# Patient Record
Sex: FEMALE | Race: WHITE | HISPANIC OR LATINO | Employment: FULL TIME | URBAN - METROPOLITAN AREA
[De-identification: names, ages, dates, MRNs, and addresses within clinical notes are randomized per-mention and may not be internally consistent; named-entity substitution may affect disease eponyms.]

---

## 2019-06-24 ENCOUNTER — OFFICE VISIT - HEALTHEAST (OUTPATIENT)
Dept: FAMILY MEDICINE | Facility: CLINIC | Age: 24
End: 2019-06-24

## 2019-06-24 DIAGNOSIS — Z00.00 ROUTINE HEALTH MAINTENANCE: ICD-10-CM

## 2019-06-24 DIAGNOSIS — Z13.1 SCREENING FOR DIABETES MELLITUS: ICD-10-CM

## 2019-06-24 DIAGNOSIS — Z13.220 SCREENING FOR LIPID DISORDERS: ICD-10-CM

## 2019-06-24 DIAGNOSIS — Z12.4 SCREENING FOR MALIGNANT NEOPLASM OF CERVIX: ICD-10-CM

## 2019-06-24 DIAGNOSIS — Z11.3 SCREEN FOR STD (SEXUALLY TRANSMITTED DISEASE): ICD-10-CM

## 2019-06-24 LAB
ANION GAP SERPL CALCULATED.3IONS-SCNC: 11 MMOL/L (ref 5–18)
BUN SERPL-MCNC: 7 MG/DL (ref 8–22)
CALCIUM SERPL-MCNC: 9.4 MG/DL (ref 8.5–10.5)
CHLORIDE BLD-SCNC: 105 MMOL/L (ref 98–107)
CHOLEST SERPL-MCNC: 171 MG/DL
CO2 SERPL-SCNC: 23 MMOL/L (ref 22–31)
CREAT SERPL-MCNC: 0.69 MG/DL (ref 0.6–1.1)
FASTING STATUS PATIENT QL REPORTED: YES
GFR SERPL CREATININE-BSD FRML MDRD: >60 ML/MIN/1.73M2
GLUCOSE BLD-MCNC: 92 MG/DL (ref 70–125)
HDLC SERPL-MCNC: 46 MG/DL
LDLC SERPL CALC-MCNC: 112 MG/DL
POTASSIUM BLD-SCNC: 4.3 MMOL/L (ref 3.5–5)
SODIUM SERPL-SCNC: 139 MMOL/L (ref 136–145)
TRIGL SERPL-MCNC: 63 MG/DL

## 2019-06-24 RX ORDER — CHLORAL HYDRATE 500 MG
2 CAPSULE ORAL DAILY
Status: SHIPPED | COMMUNITY
Start: 2019-06-24 | End: 2022-02-02

## 2019-06-24 ASSESSMENT — MIFFLIN-ST. JEOR: SCORE: 1811.81

## 2019-06-25 ENCOUNTER — AMBULATORY - HEALTHEAST (OUTPATIENT)
Dept: FAMILY MEDICINE | Facility: CLINIC | Age: 24
End: 2019-06-25

## 2019-06-25 DIAGNOSIS — E56.9 VITAMIN DEFICIENCY: ICD-10-CM

## 2019-06-25 DIAGNOSIS — E55.9 VITAMIN D DEFICIENCY: ICD-10-CM

## 2019-06-25 LAB
25(OH)D3 SERPL-MCNC: 16.7 NG/ML (ref 30–80)
25(OH)D3 SERPL-MCNC: 16.7 NG/ML (ref 30–80)
C TRACH DNA SPEC QL PROBE+SIG AMP: NEGATIVE
N GONORRHOEA DNA SPEC QL NAA+PROBE: NEGATIVE

## 2019-06-26 ENCOUNTER — COMMUNICATION - HEALTHEAST (OUTPATIENT)
Dept: FAMILY MEDICINE | Facility: CLINIC | Age: 24
End: 2019-06-26

## 2019-06-26 LAB
BKR LAB AP ABNORMAL BLEEDING: NO
BKR LAB AP BIRTH CONTROL/HORMONES: NORMAL
BKR LAB AP CERVICAL APPEARANCE: NORMAL
BKR LAB AP GYN ADEQUACY: NORMAL
BKR LAB AP GYN INTERPRETATION: NORMAL
BKR LAB AP HPV REFLEX: NORMAL
BKR LAB AP LMP: NORMAL
BKR LAB AP PATIENT STATUS: NORMAL
BKR LAB AP PREVIOUS ABNORMAL: NORMAL
BKR LAB AP PREVIOUS NORMAL: 2015
HIGH RISK?: NO
PATH REPORT.COMMENTS IMP SPEC: NORMAL
RESULT FLAG (HE HISTORICAL CONVERSION): NORMAL

## 2020-02-06 ENCOUNTER — OFFICE VISIT - HEALTHEAST (OUTPATIENT)
Dept: FAMILY MEDICINE | Facility: CLINIC | Age: 25
End: 2020-02-06

## 2020-02-06 DIAGNOSIS — F32.A ANXIETY AND DEPRESSION: ICD-10-CM

## 2020-02-06 DIAGNOSIS — F41.9 ANXIETY AND DEPRESSION: ICD-10-CM

## 2020-02-06 RX ORDER — MEDROXYPROGESTERONE ACETATE 10 MG
TABLET ORAL
Status: SHIPPED | COMMUNITY
Start: 2020-01-28 | End: 2022-02-02

## 2020-02-06 RX ORDER — ASCORBIC ACID 125 MG
TABLET,CHEWABLE ORAL
Status: SHIPPED | COMMUNITY
Start: 2020-01-28 | End: 2022-08-01

## 2020-02-06 ASSESSMENT — ANXIETY QUESTIONNAIRES
3. WORRYING TOO MUCH ABOUT DIFFERENT THINGS: NEARLY EVERY DAY
5. BEING SO RESTLESS THAT IT IS HARD TO SIT STILL: MORE THAN HALF THE DAYS
6. BECOMING EASILY ANNOYED OR IRRITABLE: MORE THAN HALF THE DAYS
2. NOT BEING ABLE TO STOP OR CONTROL WORRYING: NEARLY EVERY DAY
1. FEELING NERVOUS, ANXIOUS, OR ON EDGE: NEARLY EVERY DAY
7. FEELING AFRAID AS IF SOMETHING AWFUL MIGHT HAPPEN: MORE THAN HALF THE DAYS
IF YOU CHECKED OFF ANY PROBLEMS ON THIS QUESTIONNAIRE, HOW DIFFICULT HAVE THESE PROBLEMS MADE IT FOR YOU TO DO YOUR WORK, TAKE CARE OF THINGS AT HOME, OR GET ALONG WITH OTHER PEOPLE: VERY DIFFICULT
4. TROUBLE RELAXING: NEARLY EVERY DAY
GAD7 TOTAL SCORE: 18

## 2020-02-06 ASSESSMENT — PATIENT HEALTH QUESTIONNAIRE - PHQ9: SUM OF ALL RESPONSES TO PHQ QUESTIONS 1-9: 15

## 2020-02-12 ENCOUNTER — RECORDS - HEALTHEAST (OUTPATIENT)
Dept: ADMINISTRATIVE | Facility: OTHER | Age: 25
End: 2020-02-12

## 2020-04-01 ENCOUNTER — COMMUNICATION - HEALTHEAST (OUTPATIENT)
Dept: FAMILY MEDICINE | Facility: CLINIC | Age: 25
End: 2020-04-01

## 2020-04-06 ENCOUNTER — OFFICE VISIT - HEALTHEAST (OUTPATIENT)
Dept: FAMILY MEDICINE | Facility: CLINIC | Age: 25
End: 2020-04-06

## 2020-04-06 DIAGNOSIS — M79.622 PAIN OF LEFT UPPER ARM: ICD-10-CM

## 2020-04-06 DIAGNOSIS — R53.83 FATIGUE, UNSPECIFIED TYPE: ICD-10-CM

## 2020-04-06 DIAGNOSIS — F41.9 ANXIETY: ICD-10-CM

## 2020-04-06 DIAGNOSIS — R14.0 BLOATING: ICD-10-CM

## 2020-04-06 DIAGNOSIS — R19.8 ALTERNATING CONSTIPATION AND DIARRHEA: ICD-10-CM

## 2020-04-07 ENCOUNTER — AMBULATORY - HEALTHEAST (OUTPATIENT)
Dept: LAB | Facility: CLINIC | Age: 25
End: 2020-04-07

## 2020-04-07 DIAGNOSIS — R19.8 ALTERNATING CONSTIPATION AND DIARRHEA: ICD-10-CM

## 2020-04-07 DIAGNOSIS — R53.83 FATIGUE, UNSPECIFIED TYPE: ICD-10-CM

## 2020-04-07 LAB
ALBUMIN SERPL-MCNC: 4.1 G/DL (ref 3.5–5)
ALP SERPL-CCNC: 84 U/L (ref 45–120)
ALT SERPL W P-5'-P-CCNC: 22 U/L (ref 0–45)
ANION GAP SERPL CALCULATED.3IONS-SCNC: 11 MMOL/L (ref 5–18)
AST SERPL W P-5'-P-CCNC: 18 U/L (ref 0–40)
BILIRUB SERPL-MCNC: 0.4 MG/DL (ref 0–1)
BUN SERPL-MCNC: 11 MG/DL (ref 8–22)
CALCIUM SERPL-MCNC: 9.1 MG/DL (ref 8.5–10.5)
CHLORIDE BLD-SCNC: 105 MMOL/L (ref 98–107)
CO2 SERPL-SCNC: 24 MMOL/L (ref 22–31)
CREAT SERPL-MCNC: 0.7 MG/DL (ref 0.6–1.1)
ERYTHROCYTE [DISTWIDTH] IN BLOOD BY AUTOMATED COUNT: 11.3 % (ref 11–14.5)
GFR SERPL CREATININE-BSD FRML MDRD: >60 ML/MIN/1.73M2
GLUCOSE BLD-MCNC: 80 MG/DL (ref 70–125)
HCT VFR BLD AUTO: 41.3 % (ref 35–47)
HGB BLD-MCNC: 14.2 G/DL (ref 12–16)
MCH RBC QN AUTO: 31.8 PG (ref 27–34)
MCHC RBC AUTO-ENTMCNC: 34.5 G/DL (ref 32–36)
MCV RBC AUTO: 92 FL (ref 80–100)
PLATELET # BLD AUTO: 302 THOU/UL (ref 140–440)
PMV BLD AUTO: 7.4 FL (ref 7–10)
POTASSIUM BLD-SCNC: 4.2 MMOL/L (ref 3.5–5)
PROT SERPL-MCNC: 7 G/DL (ref 6–8)
RBC # BLD AUTO: 4.47 MILL/UL (ref 3.8–5.4)
SODIUM SERPL-SCNC: 140 MMOL/L (ref 136–145)
TSH SERPL DL<=0.005 MIU/L-ACNC: 1.28 UIU/ML (ref 0.3–5)
VIT B12 SERPL-MCNC: 641 PG/ML (ref 213–816)
WBC: 7.5 THOU/UL (ref 4–11)

## 2020-04-10 ENCOUNTER — COMMUNICATION - HEALTHEAST (OUTPATIENT)
Dept: FAMILY MEDICINE | Facility: CLINIC | Age: 25
End: 2020-04-10

## 2020-04-10 DIAGNOSIS — R14.0 BLOATING: ICD-10-CM

## 2020-04-10 DIAGNOSIS — R10.2 PELVIC PAIN IN FEMALE: ICD-10-CM

## 2020-04-10 LAB
25(OH)D3 SERPL-MCNC: 25.3 NG/ML (ref 30–80)
25(OH)D3 SERPL-MCNC: 25.3 NG/ML (ref 30–80)

## 2020-04-28 ENCOUNTER — HOSPITAL ENCOUNTER (OUTPATIENT)
Dept: ULTRASOUND IMAGING | Facility: CLINIC | Age: 25
Discharge: HOME | End: 2020-04-28

## 2020-04-28 DIAGNOSIS — R10.2 PELVIC PAIN IN FEMALE: ICD-10-CM

## 2020-04-28 DIAGNOSIS — R14.0 BLOATING: ICD-10-CM

## 2020-04-29 ENCOUNTER — AMBULATORY - HEALTHEAST (OUTPATIENT)
Dept: FAMILY MEDICINE | Facility: CLINIC | Age: 25
End: 2020-04-29

## 2020-04-29 DIAGNOSIS — N83.202 LEFT OVARIAN CYST: ICD-10-CM

## 2020-06-08 ENCOUNTER — COMMUNICATION - HEALTHEAST (OUTPATIENT)
Dept: FAMILY MEDICINE | Facility: CLINIC | Age: 25
End: 2020-06-08

## 2020-06-11 ENCOUNTER — COMMUNICATION - HEALTHEAST (OUTPATIENT)
Dept: SCHEDULING | Facility: CLINIC | Age: 25
End: 2020-06-11

## 2020-06-16 ENCOUNTER — OFFICE VISIT - HEALTHEAST (OUTPATIENT)
Dept: FAMILY MEDICINE | Facility: CLINIC | Age: 25
End: 2020-06-16

## 2020-06-16 DIAGNOSIS — E28.2 PCOS (POLYCYSTIC OVARIAN SYNDROME): ICD-10-CM

## 2020-06-16 DIAGNOSIS — Z30.011 ORAL CONTRACEPTION INITIATION: ICD-10-CM

## 2020-06-29 ENCOUNTER — HOSPITAL ENCOUNTER (OUTPATIENT)
Dept: ULTRASOUND IMAGING | Facility: CLINIC | Age: 25
Discharge: HOME | End: 2020-06-29

## 2020-06-29 DIAGNOSIS — N83.202 LEFT OVARIAN CYST: ICD-10-CM

## 2020-08-26 ENCOUNTER — COMMUNICATION - HEALTHEAST (OUTPATIENT)
Dept: FAMILY MEDICINE | Facility: CLINIC | Age: 25
End: 2020-08-26

## 2020-09-30 ENCOUNTER — AMBULATORY - HEALTHEAST (OUTPATIENT)
Dept: FAMILY MEDICINE | Facility: CLINIC | Age: 25
End: 2020-09-30

## 2020-09-30 ENCOUNTER — COMMUNICATION - HEALTHEAST (OUTPATIENT)
Dept: FAMILY MEDICINE | Facility: CLINIC | Age: 25
End: 2020-09-30

## 2020-09-30 DIAGNOSIS — Z78.9 USES BIRTH CONTROL: ICD-10-CM

## 2021-04-18 ENCOUNTER — MEDICAL CORRESPONDENCE (OUTPATIENT)
Dept: HEALTH INFORMATION MANAGEMENT | Facility: CLINIC | Age: 26
End: 2021-04-18

## 2021-05-27 ASSESSMENT — PATIENT HEALTH QUESTIONNAIRE - PHQ9: SUM OF ALL RESPONSES TO PHQ QUESTIONS 1-9: 15

## 2021-05-28 ASSESSMENT — ANXIETY QUESTIONNAIRES: GAD7 TOTAL SCORE: 18

## 2021-05-29 NOTE — PROGRESS NOTES
FEMALE PREVENTATIVE EXAM    Assessment and Plan:       1. Routine health maintenance  Healthy female exam  - Tdap vaccine,  6yo or older,  IM  - Vitamin D, Total (25-Hydroxy)    2. Screening for diabetes mellitus  - Basic Metabolic Panel    3. Screening for lipid disorders  - Lipid Schleicher FASTING    4. Screening for malignant neoplasm of cervix  - Gynecologic Cytology (PAP Smear)    5. Screen for STD (sexually transmitted disease)  - Chlamydia trachomatis & Neisseria gonorrhoeae, Amplified Detection     Next follow up:  Return in about 1 year (around 6/24/2020) for Physical.    Immunization Review  Adult Imm Review: Due today, orders placed  BMI: 37.72    I discussed the following with the patient:   Adult Healthy Living: Importance of regular exercise  Healthy nutrition  Getting adequate sleep  Stress management  Contraception options    I have had an Advance Directives discussion with the patient.    Subjective:   Chief Complaint: Carline Hart is an 24 y.o. female here for a preventative health visit.     HPI: Patient is here for physical exam.  She works at [employer] full-time and currently lives with her family.  She is in a relationship, sexually active.  On OCPs, as prescribed by [facility].  Patient has a history of PCOS.  She does have some irregularity with her menstrual cycle.  Believes she had a Pap about 3 years ago, which was normal.    Patient was diagnosed with PCOS at the age of 16-17 due to irregular periods.  She did follow with gynecology at that time.  She is currently on progesterone only OCPs, and continues to have some mild spotting with it.    Patient is exercising regularly.  She has lost about 40 pounds in the last year with diet and exercise.  She feels a lot better.    Patient denies any complaints or concerns today.    Healthy Habits  Are you taking a daily aspirin? No  Do you typically exercising at least 40 min, 3-4 times per week?  Yes  Do you usually eat at least 4  "servings of fruit and vegetables a day, include whole grains and fiber and avoid regularly eating high fat foods? Yes  Have you had an eye exam in the past two years? Yes  Do you see a dentist twice per year? NO  Do you have any concerns regarding sleep? No    Safety Screen  If you own firearms, are they secured in a locked gun cabinet or with trigger locks? The patient does not own any firearms  Do you feel you are safe where you are living?: Yes (6/24/2019  1:05 PM)  Do you feel you are safe in your relationship(s)?: Yes (6/24/2019  1:05 PM)      Review of Systems:  Please see above.  The rest of the review of systems are negative for all systems.     Pap History:   No - age 21-29 PAP every 3 years recommended  Cancer Screening       Status Date      PAP SMEAR Overdue 3/27/2015           Patient Care Team:  Lynn Shell NP as PCP - General (Family Medicine)        History     Reviewed By Date/Time Sections Reviewed    Lynn Shell NP 6/24/2019  1:12 PM Tobacco    Lynn Shell NP 6/24/2019  1:10 PM Tobacco    Brittaney Weeks MA 6/24/2019  1:05 PM Tobacco            Objective:   Vital Signs:   Visit Vitals  /62   Pulse 60   Temp 98.5  F (36.9  C)   Ht 5' 6\" (1.676 m)   Wt (!) 233 lb 11.2 oz (106 kg)   BMI 37.72 kg/m           PHYSICAL EXAM  General Appearance: Alert, cooperative, no distress, appears stated age  Head: Normocephalic, without obvious abnormality, atraumatic  Eyes: PERRL, conjunctiva/corneas clear, EOM's intact  Ears: Normal TM's and external ear canals, both ears  Nose: Nares normal, septum midline  Throat: Lips, mucosa, and tongue normal; teeth and gums normal  Neck: Supple, symmetrical, trachea midline, no adenopathy;  thyroid: not enlarged, symmetric, no tenderness/mass/nodules  Back: no CVA tenderness  Lungs: Clear to auscultation bilaterally, respirations unlabored  Breasts: No breast masses, tenderness, asymmetry, or nipple discharge.  Heart: Regular rate and rhythm, S1 and " S2 normal, no murmur, rub, or gallop  Abdomen: Soft, non-tender, bowel sounds active all four quadrants,  no masses, no organomegaly  Pelvic:Normally developed genitalia with no external lesions or eruptions. Vagina and cervix show no lesions, inflammation, discharge or tenderness. No cystocele, No rectocele. No adnexal mass or tenderness.  Extremities: Extremities normal, atraumatic, no cyanosis or edema  Skin: Skin color, texture, turgor normal, no rashes or lesions  Lymph nodes: Cervical, supraclavicular, and axillary nodes normal  Neurologic: Normal   Psychologic: appropriate affective, answers all of my questions appropriately. No hallucinations, delusion, or suicidal ideations.    Lynn Shell, NP

## 2021-06-03 VITALS — WEIGHT: 233.7 LBS | HEIGHT: 66 IN | BODY MASS INDEX: 37.56 KG/M2

## 2021-06-04 VITALS
BODY MASS INDEX: 38.32 KG/M2 | HEART RATE: 100 BPM | DIASTOLIC BLOOD PRESSURE: 72 MMHG | WEIGHT: 230.3 LBS | SYSTOLIC BLOOD PRESSURE: 110 MMHG

## 2021-06-04 VITALS — WEIGHT: 225 LBS | BODY MASS INDEX: 37.44 KG/M2

## 2021-06-05 NOTE — PROGRESS NOTES
"Assessment/Plan:     1. Anxiety and depression  Symptoms do appear consistent with anxiety and depression.  No clear trigger, but she does have some upcoming changes including moving.  No suicidal ideations.  Discussed treatment of depression and anxiety including medication and/or therapy.  Patient does not wish to start any medication at this time.  She is interested in seeing a therapist.  Encouraged good sleep hygiene, regular exercise, deep breathing, and guided imagery.  Patient may continue the Vistaril as needed for anxiety.  I suspect her chest pain is secondary to anxiety.  She will certainly follow-up with any new or worsening symptoms.  - AMB REFERRAL TO MENTAL HEALTH AND ADDICTION  - Adult (18+); Outpatient Treatment; Individual/Couples/Family/Group Therapy/Health Psychology; Co-located Psychotherapy; [facility]; Please call [phone] or speak with a specialty  wh...        Subjective:     Carline Hart is a 24 y.o. female who presents for ED follow-up.  Patient was seen in the ED 2 days ago with symptoms of chest/breast pain.  She did have a work-up including labs, EKG and chest x-ray.  There were no acute findings.  Symptoms have been going on for about a month.  Reports left-sided chest pain that will sometimes radiate to the armpit or the neck.  She does not feel significantly short of breath.  She has been feeling anxious as well as a little bit more depressed.  Reports she is sleeping \"too much\".  Also feeling very low motivation or concentration to do things.  Her appetite has been very poor.  Patient cannot target any specific triggers for her symptoms.  She does feel very teary at times.  She did recently just get a new car, but this was a good change.  She will also be moving to a new apartment with her boyfriend, which is a good change as well.  Work is going well, and she does not feel a lot of anxiety there.  No history of anxiety or depression.  Patient was prescribed " Vistaril in the ED.  She does find this helpful for her anxiety, but makes her feel tired.  Patient denies any suicidal ideations.      The following portions of the patient's history were reviewed and updated as appropriate: allergies, current medications.    Review of Systems  A comprehensive review of systems was performed and was otherwise negative    Objective:     /72   Pulse 100   Wt (!) 230 lb 4.8 oz (104.5 kg)   BMI 38.32 kg/m      General Appearance: Alert, cooperative, no distress, appears stated age  Lungs: Clear to auscultation bilaterally, respirations unlabored  Heart: Regular rate and rhythm, S1 and S2 normal, no murmur, rub, or gallop  Psychologic: appropriate affective, answers all of my questions appropriately. No hallucinations, delusion, or suicidal ideations.    PHQ-9 Total Score: 15 (2/6/2020  1:00 PM)    BHARAT 7 Total Score: 18 (2/6/2020  1:00 PM)        Lynn Shell NP

## 2021-06-07 NOTE — PROGRESS NOTES
Patient: Lucy Lee Date of Service: 2020   : 1985 MRN: [id number]     This visit is being performed via video to discuss No chief complaint on file.    [facility] and her identity has been established.   She was informed that consent to treat includes permission to submit charges to the applicable insurance on file. Lucy was advised regarding the potential risk inherent in video visits, as the assessment may be limited due to what can be seen on the screen which potentially results in an incomplete assessment; as well as either of us may discontinue the video visit if it is felt that the videoconferencing connections are not adequate for his/her situation.   11-20 minutes were spent in this encounter.      SUBJECTIVE:     HISTORY OF PRESENT ILLNESS:  Lucy Lee is a 35 year old female who presents today for a check-up for spotting between menses and anxiety.    Ended up stopping Ashlyna birth control a few weeks ago. Still having night sweats.    She lowered her lexapro to 5mg and is taking buspar twice a day. Her anxiety has been much better. Thinks her sweating might be related to her meds. Also had some twitching when her lexapro was at 20mg, but has lessened since decreasing, has not entirely resolved.    PAST MEDICAL HISTORY:  Past Medical History:   Diagnosis Date   • Anxiety    • Gastroesophageal reflux disease      Patient Active Problem List   Diagnosis   • Rapid palpitations   • Migraine without aura   • Acquired hypothyroidism   • Hashimoto's thyroiditis   • Eustachian tube disorder   • MONICA (generalized anxiety disorder)   • Tiredness   • Goiter   • Pressure in head   • Ketosis (CMS/HCC)   • Thyroid nodule   • Iron deficiency   • Left flank pain   • Upper abdominal pain   • Bloating       MEDICATIONS:  Current Outpatient Medications   Medication Sig   • omeprazole (PriLOSEC) 40 MG capsule Take 1  "Carline Hart is a 25 y.o. female who is being evaluated via a billable telephone visit.      The patient has been notified of following:     \"This telephone visit will be conducted via a call between you and your physician/provider. We have found that certain health care needs can be provided without the need for a physical exam.  This service lets us provide the care you need with a short phone conversation.  If a prescription is necessary we can send it directly to your pharmacy.  If lab work is needed we can place an order for that and you can then stop by our lab to have the test done at a later time.    If during the course of the call the physician/provider feels a telephone visit is not appropriate, you will not be charged for this service.\"     Patient has given verbal consent to a Telephone visit? Yes    Carline Hart complains of    Chief Complaint   Patient presents with     Follow-up     chest and upper arm pain. is getting worse, more consitant, headache is new, neck pain, lower abdominal pain (pelvis), constipation. constantly tired     okay to call sooner       I have reviewed and updated the patient's Past Medical History, Medication List.    ALLERGIES  Patient has no known allergies.    Patient presents today via telephone for follow-up.  She was last seen about 2 months ago with symptoms of anxiety.  This was after she had been seen in the hospital for some chest and arm pain.  Patient opted to start some counseling at that time.  Her anxiety has been much better.  She has been working really hard on sleep hygiene and regular exercise.  She has also been eating much better.  Overall, her mood and mental health feel much better.  However, patient is having symptoms of fatigue, alternating constipation/diarrhea, and left arm/axilla pain.  Patient goes between feeling constipated and having looser stools.  She denies any significant abdominal pain, nausea, vomiting, or blood in her stools.  She " "does have some intermittent pelvic pain.  No abnormal vaginal discharge or bleeding.  No urinary symptoms.  She will get \"sharp/electric\" pains down her back and in her arm/armpit area.  This comes and goes.  Denies any weakness in the hands no numbness/tingling.  She denies any significant neck pain.  She has had some mild headaches.  Drinking plenty of water.  Patient has tried Tylenol, ibuprofen, heat, and massage for the left arm pain.  This has not been terribly beneficial.  She is not having any heartburn, but does feel more bloated.  She has lost a little bit of weight, but she has been trying to do this.  She feels like she is more tired than she should be.    Assessment/Plan:  1. Anxiety    2. Alternating constipation and diarrhea  - HM2(CBC w/o Differential); Future  - Comprehensive Metabolic Panel; Future  - omeprazole (PRILOSEC) 20 MG capsule; Take 1 capsule (20 mg total) by mouth daily before breakfast for 14 days.  Dispense: 14 capsule; Refill: 0    3. Pain of left upper arm    4. Bloating  - omeprazole (PRILOSEC) 20 MG capsule; Take 1 capsule (20 mg total) by mouth daily before breakfast for 14 days.  Dispense: 14 capsule; Refill: 0    5. Fatigue, unspecified type  - Vitamin D, Total (25-Hydroxy); Future  - Thyroid Cascade; Future  - Vitamin B12; Future    Patient with a variety of new symptoms.  She is done a great job of managing her anxiety with diet, exercise, sleep hygiene, and counseling.  At this point I do not think her symptoms are related to anxiety.  Possible alternating constipation/diarrhea secondary to diet changes.  Questioning whether she has some silent acid reflux with her arm/chest pain, bloating, and GI issues.  We will trial her on a PPI, omeprazole 20 mg once daily x2 weeks.  She does not seem to have any specific food triggers.  Will repeat some labs to set for her fatigue and GI issues.  Hard to assess arm pain, as it is intermittent.  Does not seem to be associated with any " capsule by mouth daily.   • ALPRAZolam (XANAX) 0.5 MG tablet Take 1 tablet by mouth 2 times daily as needed for Sleep or Anxiety.   • busPIRone (BUSPAR) 5 MG tablet Take 5 mg by mouth 2 times daily.   • escitalopram (LEXAPRO) 10 MG tablet Take 1 tablet by mouth daily.   • levonorgestrel-eth estradiol & eth estradiol (Ashlyna) 0.15-0.03 &0.01 MG tablet Take 1 tablet by mouth daily.   • baclofen (LIORESAL) 10 MG tablet Take 1 tablet by mouth at bedtime as needed (spasm).   • levothyroxine 75 MCG tablet Take 1 tablet by mouth daily.   • CRANBERRY PO    • Probiotic Product (PROBIOTIC DAILY PO)    • Cholecalciferol (VITAMIN D3) 3000 units Tab Take 6,000 Units by mouth.   • MAGNESIUM PO    • Multiple Vitamins-Minerals (WOMENS MULTIVITAMIN PO)      No current facility-administered medications for this visit.        ALLERGIES:  ALLERGIES:   Allergen Reactions   • Penicillins RASH     This includes all medications in the \"cillin\" family     • Amoxicillin Er Other (See Comments)   • Hydrocodone-Acetaminophen Other (See Comments)   • Hydrocodone-Apap Other (See Comments)       PAST SURGICAL HISTORY:  Past Surgical History:   Procedure Laterality Date   • Ankle surgery         FAMILY HISTORY:  No family history on file.    SOCIAL HISTORY:  Social History     Tobacco Use   • Smoking status: Never Smoker   • Smokeless tobacco: Never Used   Substance Use Topics   • Alcohol use: Yes     Frequency: Monthly or less     Drinks per session: 1 or 2     Comment: socially   • Drug use: No       Review of Systems   Constitutional: Negative.  Negative for appetite change, fatigue and fever.   HENT: Negative.    Eyes: Negative.    Respiratory: Negative.  Negative for shortness of breath.    Cardiovascular: Negative.  Negative for chest pain.   Gastrointestinal: Negative.    Endocrine: Negative.    Genitourinary: Positive for menstrual problem and vaginal bleeding (between anticipated menses).        Breast tenderness   Musculoskeletal:  Negative.    Skin: Negative.    Allergic/Immunologic: Negative.    Neurological: Negative.    Hematological: Negative.    Psychiatric/Behavioral: Positive for dysphoric mood. The patient is nervous/anxious.          OBJECTIVE:     Visit Vitals  LMP 09/23/2020 (Exact Date)       Wt Readings from Last 1 Encounters:   11/09/20 77.2 kg (170 lb 1.6 oz)        Physical Exam   Constitutional: She is oriented to person, place, and time. Vital signs are normal. She is cooperative. She does not appear ill. No distress.   HENT:   Head: Normocephalic and atraumatic.   Right Ear: Hearing normal.   Left Ear: Hearing normal.   Nose: Nose normal.   Eyes: Conjunctivae and lids are normal.   Neck: Phonation normal.   Pulmonary/Chest: Effort normal.   Abdominal: There is no abdominal tenderness.   Musculoskeletal:      Comments: ROM visualized on video normal.   Neurological: She is alert and oriented to person, place, and time.   Skin:   Skin visualized on video intact.   Psychiatric: She has a normal mood and affect. Her speech is normal and behavior is normal. Judgment and thought content normal. Cognition and memory are normal.   Nursing note and vitals reviewed.      ASSESSMENT AND PLAN:     Diagnoses and all orders for this visit:  MONICA (generalized anxiety disorder)  Stop lexapro all together. Continue buspar. This might resolve night sweats and twitching.  Discussed stress management, healthy diet, sleep and activity. Recommended counseling.  Hormones will be leveling off from stopping birth control.  If side effects persist we can continue to adjust medications, though she does state she feels much better.     No follow-ups on file.    The patient indicated understanding and agreed with the plan of care.      Tamanna Thakur, VIKA  12/9/2020   cervical spinal issues.  Consider physical therapy versus neurology referral down the road.    Phone call duration:  15 minutes  Start: 545  End: 402    Lynn Shell NP

## 2021-06-08 NOTE — PROGRESS NOTES
"Carline Hart is a 25 y.o. female who is being evaluated via a billable video visit.      The patient has been notified of following:     \"This video visit will be conducted via a call between you and your physician/provider. We have found that certain health care needs can be provided without the need for an in-person physical exam.  This service lets us provide the care you need with a video conversation.  If a prescription is necessary we can send it directly to your pharmacy.  If lab work is needed we can place an order for that and you can then stop by our lab to have the test done at a later time.    Video visits are billed at different rates depending on your insurance coverage. Please reach out to your insurance provider with any questions.    If during the course of the call the physician/provider feels a video visit is not appropriate, you will not be charged for this service.\"    Patient has given verbal consent to a Video visit? Yes    Will anyone else be joining your video visit? No        Video Start Time: 1137     Patient presents via video visit to discuss contraception.  She is not currently using anything.  Patient is sexually active.  She was previously following with gynecology, as she was not getting regular menstrual periods.  She does have a diagnosis of PCOS.  Patient was previously using Provera to initiate her periods, but has had normal periods the last 2 months without it.  Bleeding is usually very mild and lasts about 3 days.  Patient has been on both combined OCPs and the minipill in the past.  She recalls her anxiety and depression got much worse while on the progesterone-only pill.  She has also tried to the Nexplanon, but likes to get monthly bleeding.  Patient does not smoke.  No personal history of blood clots.  Her sister did have a blood clot after her pregnancy.    Additional provider notes: GENERAL: Healthy, alert and no distress  EYES: Eyes grossly normal to inspection. No " discharge or erythema, or obvious scleral/conjunctival abnormalities.  RESP: No audible wheeze, cough, or visible cyanosis.  No visible retractions or increased work of breathing.    NEURO: Cranial nerves grossly intact. Mentation and speech appropriate for age.  PSYCH: Mentation appears normal, affect normal/bright, judgement and insight intact, normal speech and appearance well-groomed    Assessment/Plan:  1. Oral contraception initiation  No contraindications to use.  Patient will start this Sunday.  Instructed on proper use and possible side effects of medication.  Recommend backup contraception or abstinence until she starts the pill and the first week she is back on.  May take 3 months to fully regulate cycle.  She will notify me if she develops any side effects.   - norethindrone-ethinyl estradiol (MICROGESTIN 1/20) 1-20 mg-mcg per tablet; Take 1 tablet by mouth daily.  Dispense: 3 Package; Refill: 4    2. PCOS (polycystic ovarian syndrome)  - norethindrone-ethinyl estradiol (MICROGESTIN 1/20) 1-20 mg-mcg per tablet; Take 1 tablet by mouth daily.  Dispense: 3 Package; Refill: 4    Video-Visit Details    Type of service:  Video Visit    Video End Time (time video stopped): 1150  Originating Location (pt. Location): Home    Distant Location (provider location):  [facility]     Platform used for Video Visit: Sloane Shell NP

## 2021-06-16 PROBLEM — E28.2 PCOS (POLYCYSTIC OVARIAN SYNDROME): Status: ACTIVE | Noted: 2017-11-21

## 2021-06-16 PROBLEM — E55.9 VITAMIN D DEFICIENCY: Status: ACTIVE | Noted: 2019-06-25

## 2021-06-17 NOTE — PATIENT INSTRUCTIONS - HE
Patient Instructions by Lynn Shell NP at 6/24/2019  1:00 PM     Author: Lynn Shell NP Service: -- Author Type: Nurse Practitioner    Filed: 6/24/2019  1:08 PM Encounter Date: 6/24/2019 Status: Signed    : Lynn Shell NP (Nurse Practitioner)       Patient Education     Prevention Guidelines, Women Ages 18 to 39  Screening tests and vaccines are an important part of managing your health. A screening test is done to find possible disorders or diseases in people who don't have any symptoms. The goal is to find a disease early so lifestyle changes can be made and you can be watched more closely to reduce the risk of disease, or to detect it early enough to treat it most effectively. Screening tests are not considered diagnostic, but are used to determine if more testing is needed. Health counseling is essential, too. Below are guidelines for these, for women ages 18 to 39. Talk with your healthcare provider to make sure youre up-to-date on what you need.  Screening Who needs it How often   Alcohol misuse All women in this age group At routine exams   Blood pressure All women in this age group Yearly checkup if your blood pressure is normal  Normal blood pressure is less than 120/80 mm Hg  If your blood pressure reading is higher than normal, follow the advice of your healthcare provider   Breast cancer All women in this age group should talk with their healthcare providers about the need for clinical breast exams (CBE)1 Clinical breast exam every 3 years1   Cervical cancer Women ages 21 and older Women between ages 21 and 29 should have a Pap test every 3 years; women between ages 30 and 65 are advised to have a Pap test plus an HPV test every 5 years   Chlamydia Sexually active women ages 24 and younger, and women at increased risk for infection Every 3 years if you're at risk or have symptoms   Depression All women in this age group At routine exams   Type 2 diabates, prediabetes All women  with no symptoms who are overweight or obese and have 1 or more other risk factors for diabetes At least every 3 years. Also, testing for diabetes during pregnancy after the 24th week.    Type 2 diabetes, prediabetes All women diagnosed with gestational diabetes Lifelong testing every 3 years   Type 2 diabetes All women with prediabetes Every year   Gonorrhea Sexually active women at increased risk for infection At routine exams   Hepatitis C Anyone at increased risk At routine exams   HIV All women should be tested at least once for HIV between the ages of 13 and 64 At routine exams. Those with risk factors for HIV should be tested at least annually.    Obesity All women in this age group At routine exams   Syphilis Women at increased risk for infection should talk with their healthcare provider At routine exams   Tuberculosis Women at increased risk for infection should talk with their healthcare provider Ask your healthcare provider   Vision All women in this age group At least 1 complete exam in your 20s, and 2 in your 30s   Vaccine2 Who needs it How often   Chickenpox (varicella) All women in this age group who have no record of this infection or vaccine 2 doses; the second dose should be given 4 to 8 weeks after the first dose   Hepatitis A Women at increased risk for infection should talk with their healthcare provider 2 doses given at least 6 months apart   Hepatitis B Women at increased risk for infection should talk with their healthcare provider 3 doses over 6 months; second dose should be given 1 month after the first dose; the third dose should be given at least 2 months after the second dose and at least 4 months after the first dose   Haemophilus influenzae Type B (HIB) Women at increased risk for infection should talk with their healthcare provider 1 to 3 doses   Human papillomavirus (HPV) All women in this age group up to age 26 3 doses; the second dose should be given 1 to 2 months after the first  dose and the third dose given 6 months after the first dose   Influenza (flu) All women in this age group Once a year   Measles, mumps, rubella (MMR) All women in this age group who have no record of these infections or vaccines 1 or 2 doses   Meningococcal Women at increased risk for infection should talk with their healthcare provider 1 or more doses   Pneumococcal conjugate vaccine (PCV13) and pneumococcal polysaccharide vaccine (PPSV23) Women at increased risk for infection should talk with their healthcare provider PCV13: 1 dose ages 19 to 65 (protects against 13 types of pneumococcal bacteria)  PPSV23: 1 to 2 doses through age 64, or 1 dose at 65 or older (protects against 23 types of pneumococcal bacteria)      Tetanus/diphtheria/pertussis (Td/Tdap) booster All women in this age group Td every 10 years, or a one-time dose of Tdap instead of a Td booster after age 18, then Td every 10 years   Counseling Who needs it How often   BRCA gene mutation testing for breast and ovarian cancer susceptibility Women with increased risk for having gene mutation When your risk is known   Breast cancer and chemoprevention Women at high risk for breast cancer When your risk is known   Diet and exercise Women who are overweight or obese When diagnosed, and then at routine exams   Domestic violence Women at the age in which they are able to have children At routine exams   Sexually transmitted infection prevention Women who are sexually active At routine exams   Skin cancer Prevention of skin cancer in fair-skinned adults At routine exams   Use of tobacco and the health effects it can cause All women in this age group Every visit   1According to the ACS, women ages 20 to 39 years should have a clinical breast exam (CBE) as part of their routine health exam every 3 years. Breast self-exams are an option for women starting in their 20s. But the USPSTF does not recommend CBE.  2Those who are 18 years old and not up-to-date on  their childhood vaccines should get all appropriate catch-up vaccines recommended by the CDC.  Date Last Reviewed: 10/1/2017    1867-9136 [employer]. [address] [place] [address], [place] [address]. All rights reserved. This information is not intended as a substitute for professional medical care. Always follow your healthcare professional's instructions.

## 2021-06-26 ENCOUNTER — HEALTH MAINTENANCE LETTER (OUTPATIENT)
Age: 26
End: 2021-06-26

## 2021-10-16 ENCOUNTER — HEALTH MAINTENANCE LETTER (OUTPATIENT)
Age: 26
End: 2021-10-16

## 2021-12-30 ENCOUNTER — MEDICAL CORRESPONDENCE (OUTPATIENT)
Dept: HEALTH INFORMATION MANAGEMENT | Facility: CLINIC | Age: 26
End: 2021-12-30

## 2022-02-02 ENCOUNTER — OFFICE VISIT (OUTPATIENT)
Dept: OBGYN | Facility: CLINIC | Age: 27
End: 2022-02-02
Payer: COMMERCIAL

## 2022-02-02 VITALS
HEIGHT: 65 IN | DIASTOLIC BLOOD PRESSURE: 76 MMHG | TEMPERATURE: 97.7 F | RESPIRATION RATE: 16 BRPM | WEIGHT: 245 LBS | HEART RATE: 81 BPM | BODY MASS INDEX: 40.82 KG/M2 | SYSTOLIC BLOOD PRESSURE: 123 MMHG

## 2022-02-02 DIAGNOSIS — Z30.011 BCP (BIRTH CONTROL PILLS) INITIATION: ICD-10-CM

## 2022-02-02 DIAGNOSIS — Z12.4 CERVICAL CANCER SCREENING: Primary | ICD-10-CM

## 2022-02-02 DIAGNOSIS — R79.89 LOW VITAMIN D LEVEL: ICD-10-CM

## 2022-02-02 PROBLEM — E66.01 MORBID OBESITY (H): Status: ACTIVE | Noted: 2022-02-02

## 2022-02-02 PROCEDURE — 82306 VITAMIN D 25 HYDROXY: CPT | Performed by: ADVANCED PRACTICE MIDWIFE

## 2022-02-02 PROCEDURE — 36415 COLL VENOUS BLD VENIPUNCTURE: CPT | Performed by: ADVANCED PRACTICE MIDWIFE

## 2022-02-02 PROCEDURE — G0145 SCR C/V CYTO,THINLAYER,RESCR: HCPCS | Performed by: ADVANCED PRACTICE MIDWIFE

## 2022-02-02 RX ORDER — ACETAMINOPHEN AND CODEINE PHOSPHATE 120; 12 MG/5ML; MG/5ML
0.35 SOLUTION ORAL DAILY
Qty: 84 TABLET | Refills: 3 | Status: SHIPPED | OUTPATIENT
Start: 2022-02-02 | End: 2022-08-01

## 2022-02-02 ASSESSMENT — ANXIETY QUESTIONNAIRES
6. BECOMING EASILY ANNOYED OR IRRITABLE: SEVERAL DAYS
7. FEELING AFRAID AS IF SOMETHING AWFUL MIGHT HAPPEN: NOT AT ALL
GAD7 TOTAL SCORE: 1
5. BEING SO RESTLESS THAT IT IS HARD TO SIT STILL: NOT AT ALL
3. WORRYING TOO MUCH ABOUT DIFFERENT THINGS: NOT AT ALL
1. FEELING NERVOUS, ANXIOUS, OR ON EDGE: NOT AT ALL
2. NOT BEING ABLE TO STOP OR CONTROL WORRYING: NOT AT ALL
IF YOU CHECKED OFF ANY PROBLEMS ON THIS QUESTIONNAIRE, HOW DIFFICULT HAVE THESE PROBLEMS MADE IT FOR YOU TO DO YOUR WORK, TAKE CARE OF THINGS AT HOME, OR GET ALONG WITH OTHER PEOPLE: NOT DIFFICULT AT ALL

## 2022-02-02 ASSESSMENT — PATIENT HEALTH QUESTIONNAIRE - PHQ9
SUM OF ALL RESPONSES TO PHQ QUESTIONS 1-9: 2
5. POOR APPETITE OR OVEREATING: NOT AT ALL

## 2022-02-02 ASSESSMENT — MIFFLIN-ST. JEOR: SCORE: 1847.19

## 2022-02-02 NOTE — PROGRESS NOTES
"Initial There were no vitals taken for this visit. Estimated body mass index is 37.44 kg/m  as calculated from the following:    Height as of 20: 1.651 m (5' 5\").    Weight as of 20: 102.1 kg (225 lb). .       SUBJECTIVE:  is here for a 6-week postpartum checkup.    Delivery date was 21. She had a  of a viable , with Preeclampsia complications.  Since delivery, she has been breast feeding.  She has no signs of infection, bleeding or other complications.    We discussed contraception and she has chosen mini pill / progesterone only pill.  She  has not had intercourse since delivery and complains of No discomfort. Patient screened for postpartum depression and complaints are NEGATIVE. Screening has also been completed for intimate partner violence.    EXAM:  GENERAL healthy, alert, no distress, cooperative, smiling and over weight  RESP: Clear to auscultation  BREASTS: NEGATIVE  CV: NEGATIVE  GYN PELVIC: NEGATIVE  Well healed   Speculum placed and pap collected   SKIN: no ulcers, lesions or rash  PSYCH: NEGATIVE      ASSESSMENT:   Normal postpartum exam after .  Birth control pill initiation   Vit D     PLAN:  Return as needed or at time interval for next routine pap, pelvic, or breast exam.  Encourage Kegals and abdominal exercise.  Continue a multi vitamin supplement, especially if breastfeeding.  Pap smear was obtained.  Post partum Hgb was not obtained.  Vit D collected per her request due to history of severe vit D deficiency   Reviewed how to take minipill.  Use back up for 1 week.  Take pills at same time every day.      Jeannette Rooney, STEPHON, APRN, CNM          "

## 2022-02-03 LAB — DEPRECATED CALCIDIOL+CALCIFEROL SERPL-MC: 27 UG/L (ref 20–75)

## 2022-02-03 ASSESSMENT — ANXIETY QUESTIONNAIRES: GAD7 TOTAL SCORE: 1

## 2022-02-07 LAB
BKR LAB AP GYN ADEQUACY: NORMAL
BKR LAB AP GYN INTERPRETATION: NORMAL
BKR LAB AP HPV REFLEX: NORMAL
BKR LAB AP PREVIOUS ABNORMAL: NORMAL
PATH REPORT.COMMENTS IMP SPEC: NORMAL
PATH REPORT.COMMENTS IMP SPEC: NORMAL
PATH REPORT.RELEVANT HX SPEC: NORMAL

## 2022-04-26 ENCOUNTER — OFFICE VISIT (OUTPATIENT)
Dept: URGENT CARE | Facility: URGENT CARE | Age: 27
End: 2022-04-26
Payer: COMMERCIAL

## 2022-04-26 VITALS
TEMPERATURE: 98.7 F | SYSTOLIC BLOOD PRESSURE: 118 MMHG | OXYGEN SATURATION: 97 % | DIASTOLIC BLOOD PRESSURE: 88 MMHG | HEART RATE: 101 BPM

## 2022-04-26 DIAGNOSIS — R07.0 THROAT PAIN: Primary | ICD-10-CM

## 2022-04-26 LAB
DEPRECATED S PYO AG THROAT QL EIA: NEGATIVE
GROUP A STREP BY PCR: NOT DETECTED

## 2022-04-26 PROCEDURE — 99203 OFFICE O/P NEW LOW 30 MIN: CPT | Performed by: PHYSICIAN ASSISTANT

## 2022-04-26 PROCEDURE — 87651 STREP A DNA AMP PROBE: CPT | Performed by: PHYSICIAN ASSISTANT

## 2022-04-26 NOTE — PROGRESS NOTES
"  Assessment & Plan     Throat pain  Rapid strep negative. This is likely viral. Continue with supportive care. Get plenty of rest and push fluids. Can use Tylenol and/or ibuprofen as needed for pain and/or fever control. Discussed quarantine guidelines. Return to clinic if symptoms worsen or do not improve; otherwise follow up as needed     - Streptococcus A Rapid Screen w/Reflex to PCR - Clinic Collect  - Group A Streptococcus PCR Throat Swab             BMI:   Estimated body mass index is 40.77 kg/m  as calculated from the following:    Height as of 2/2/22: 1.651 m (5' 5\").    Weight as of 2/2/22: 111.1 kg (245 lb).           Return in about 1 week (around 5/3/2022), or if symptoms worsen or fail to improve.    Kiah Mahoney PA-C  [facility] [place]                Subjective   Chief Complaint   Patient presents with     Pharyngitis     Sore throat started 3 days ago, throat is swollen and feels raw, worse in the morning. Had a HA for 1 day then went away. When she coughs to clear her throat it is yellow mucus.         HPI       URI Adult    Onset of symptoms was 3 day(s) ago.  Course of illness is same.    Severity moderate  Current and Associated symptoms: sore throat   Treatment measures tried include Tylenol/Ibuprofen.  Predisposing factors include None.                Review of Systems   Constitutional, HEENT, cardiovascular, pulmonary, gi and gu systems are negative, except as otherwise noted.      Objective    /88   Pulse 101   Temp 98.7  F (37.1  C) (Tympanic)   SpO2 97%   There is no height or weight on file to calculate BMI.  Physical Exam  Constitutional:       Appearance: She is well-developed.   HENT:      Head: Normocephalic.      Right Ear: Tympanic membrane and ear canal normal.      Left Ear: Tympanic membrane and ear canal normal.      Mouth/Throat:      Pharynx: Posterior oropharyngeal erythema present.   Eyes:      Conjunctiva/sclera: Conjunctivae normal. "   Cardiovascular:      Rate and Rhythm: Normal rate.      Heart sounds: Normal heart sounds.   Pulmonary:      Effort: Pulmonary effort is normal.      Breath sounds: Normal breath sounds.   Skin:     General: Skin is warm and dry.      Findings: No rash.   Psychiatric:         Behavior: Behavior normal.

## 2022-04-27 NOTE — RESULT ENCOUNTER NOTE
Group A Streptococcus PCR is NEGATIVE  No treatment or change in treatment [facility] ED lab result Strep Group A protocol.

## 2022-07-23 ENCOUNTER — NURSE TRIAGE (OUTPATIENT)
Dept: NURSING | Facility: CLINIC | Age: 27
End: 2022-07-23

## 2022-07-24 NOTE — TELEPHONE ENCOUNTER
"Patient is calling to reports abdominal pain.  She is about 7 11/2 months post partum.  At 1 month post partum, she developed intense pains and was diagnosed with gallstones.  Her pain has been manageable the last few months.  Tonight at 9pm, she was having severe pains and went down to 4-5/10 after emesis.  Her pain after vomiting is 4-5/10.  She feels gassy and bloated.  Pain is mid umbilical line and in her upper abdomen.       Took ibuprofen PM 2 hours ago, which she takes for headaches every night.    Disposition:  See HCP within 4 hours or PCP triage.  Pt has no PCP with [facility].  Care advise given.  Informed patient that if her pain gets to be severe and has lasted for an hour, she needs to be seen in the ED right away.  Pt verbalized understanding.    Bailey Longoria, RN, BSN Nurse Triage Advisor 7/23/2022 11:41 PM       Reason for Disposition    Pain is mainly in upper abdomen  (if needed ask: \"is it mainly above the belly button?\")    [1] MILD-MODERATE pain AND [2] constant AND [3] present > 2 hours    Additional Information    Negative: Shock suspected (e.g., cold/pale/clammy skin, too weak to stand, low BP, rapid pulse)    Negative: Difficult to awaken or acting confused (e.g., disoriented, slurred speech)    Negative: Passed out (i.e., lost consciousness, collapsed and was not responding)    Negative: Sounds like a life-threatening emergency to the triager    Negative: Chest pain    Negative: Severe difficulty breathing (e.g., struggling for each breath, speaks in single words)    Negative: Shock suspected (e.g., cold/pale/clammy skin, too weak to stand, low BP, rapid pulse)    Negative: Difficult to awaken or acting confused (e.g., disoriented, slurred speech)    Negative: Passed out (i.e., lost consciousness, collapsed and was not responding)    Negative: Visible sweat on face or sweat dripping down face    Negative: Sounds like a life-threatening emergency to the triager    Negative: [1] SEVERE pain (e.g., " "excruciating) AND [2] present > 1 hour    Negative: [1] Pain lasts > 10 minutes AND [2] age > 50    Negative: [1] Pain lasts > 10 minutes AND [2] age > 40 AND [3] associated chest, arm, neck, upper back or jaw pain    Negative: [1] Pain lasts > 10 minutes AND [2] age > 35 AND [3] at least one cardiac risk factor (i.e., hypertension, diabetes, obesity, smoker or strong family history of heart disease)    Negative: [1] Pain lasts > 10 minutes AND [2] history of heart disease (i.e., heart attack, bypass surgery, angina, angioplasty, CHF; not just a heart murmur)    Negative: [1] Pain lasts > 10 minutes AND [2] difficulty breathing    Negative: [1] Vomiting AND [2] contains red blood  (Exception: few streaks and only occurred once)    Negative: [1] Vomiting AND [2] contains black (\"coffee ground\") material    Negative: Blood in bowel movements  (Exception: Blood on surface of BM with constipation)    Negative: Black or tarry bowel movements  (Exception: chronic-unchanged  black-grey bowel movements AND is taking iron pills or Pepto-bismol)    Negative: [1] Pregnant > 24 weeks AND [2] hand or face swelling    Negative: Patient sounds very sick or weak to the triager    Protocols used: ABDOMINAL PAIN - FEMALE-A-AH, ABDOMINAL PAIN - UPPER-A-AH      "

## 2022-08-01 ENCOUNTER — TELEPHONE (OUTPATIENT)
Dept: SURGERY | Facility: OTHER | Age: 27
End: 2022-08-01

## 2022-08-01 ENCOUNTER — OFFICE VISIT (OUTPATIENT)
Dept: SURGERY | Facility: CLINIC | Age: 27
End: 2022-08-01
Payer: COMMERCIAL

## 2022-08-01 VITALS
SYSTOLIC BLOOD PRESSURE: 122 MMHG | BODY MASS INDEX: 41.65 KG/M2 | HEIGHT: 65 IN | WEIGHT: 250 LBS | DIASTOLIC BLOOD PRESSURE: 82 MMHG | TEMPERATURE: 98.8 F

## 2022-08-01 DIAGNOSIS — K80.50 BILIARY COLIC: Primary | ICD-10-CM

## 2022-08-01 PROCEDURE — 99204 OFFICE O/P NEW MOD 45 MIN: CPT | Performed by: SPECIALIST

## 2022-08-01 ASSESSMENT — PAIN SCALES - GENERAL: PAINLEVEL: NO PAIN (0)

## 2022-08-01 NOTE — TELEPHONE ENCOUNTER
Type of surgery: Lap Unique  Location of surgery: [facility]  Date and time of surgery: 8/24  Surgeon: mary  Pre-Op Appt Date: 8/11  Post-Op Appt Date: 9/1   Packet sent out: Yes  Pre-cert/Authorization completed:  Not Applicable  Date: na

## 2022-08-01 NOTE — PROGRESS NOTES
Consult requested by Lynn Flower    Reason for consultation gallstones    HPI:  Patient is a 27-year-old female who after delivering her son last November developed severe upper chest pain rating to her back with associated nausea and vomiting.  It occurred after eating a high-fat meal.  She was seen in the ER in [place] and subsequent imaging revealed gallstones which she has now referred.  She has had several attacks since then.  She had a severe 1 about 2 weeks ago after eating cheese curds where she also had some right upper quadrant discomfort as well.  There was associated nausea vomiting but that resolved after a day or so.  Since then she is stuck to a low-fat diet and is felt much better.    Past Medical History:   Diagnosis Date     Environmental allergies      Ovarian cyst      Past Surgical History:   Procedure Laterality Date     OTHER SURGICAL HISTORY      Cervical repair     No current outpatient medications on file.     No current facility-administered medications for this visit.     Social History     Socioeconomic History     Marital status: Single     Spouse name: Not on file     Number of children: Not on file     Years of education: Not on file     Highest education level: Not on file   Occupational History     Not on file   Tobacco Use     Smoking status: Never Smoker     Smokeless tobacco: Never Used   Substance and Sexual Activity     Alcohol use: Yes     Drug use: Not on file     Sexual activity: Yes     Partners: Male     Birth control/protection: OCP   Other Topics Concern     Not on file   Social History Narrative     Not on file     Social Determinants of Health     Financial Resource Strain: Not on file   Food Insecurity: Not on file   Transportation Needs: Not on file   Physical Activity: Not on file   Stress: Not on file   Social Connections: Not on file   Intimate Partner Violence: Not on file   Housing Stability: Not on file     Family History   Problem Relation Age of Onset      Cervical Cancer Paternal Grandmother         Post menopause     Liver Disease Father      Throat cancer Maternal Grandfather      Diabetes Mother      Hypertension Mother         ROS: 10 point ROS neg other than the symptoms noted above in the HPI.    PE:  B/P: 122/82, T: 98.8, P: Data Unavailable, R: Data Unavailable  General: well developed, well nourished F who appears their stated age  HEENT: NC/AT, EOMI, (-)icterus, (-)injection  Neck: Supple, No JVD  Chest: CTA  Heart: S1, S2, (-)m/r/g  Abd: Soft, minimal right upper quadrant tenderness to deep palpation., non distended, no masses  Ext; Warm, no edema  Psych: AAOx3  Neuro: No focal deficits    Labs obtained reviewed-LFTs normal    Ultrasound from [place] reviewed cholelithiasis but no cholecystitis    Impression/plan:  Is a 27-year-old lady with probable biliary colic.  I discussed the etiology of biliary colic as well as the role of cholecystectomy.  She expressed understanding.  After discussion with the patient plan at this time is for laparoscopic cholecystectomy.   The procedure, risks, benefits, and alternatives were discussed and the patient agrees to proceed.  She will be scheduled in the near future.  She knows to remain on low-fat diet in the meantime.    Renny Harris MD, FACS

## 2022-08-01 NOTE — LETTER
8/1/2022         RE: Carline Hart  [address] [facility] [address]        Dear Colleague,    Thank you for referring your patient, Carline Hart, to the [facility]. Please see a copy of my visit note below.    Consult requested by Lynn Flower    Reason for consultation gallstones    HPI:  Patient is a 27-year-old female who after delivering her son last November developed severe upper chest pain rating to her back with associated nausea and vomiting.  It occurred after eating a high-fat meal.  She was seen in the ER in [place] and subsequent imaging revealed gallstones which she has now referred.  She has had several attacks since then.  She had a severe 1 about 2 weeks ago after eating cheese curds where she also had some right upper quadrant discomfort as well.  There was associated nausea vomiting but that resolved after a day or so.  Since then she is stuck to a low-fat diet and is felt much better.    Past Medical History:   Diagnosis Date     Environmental allergies      Ovarian cyst      Past Surgical History:   Procedure Laterality Date     OTHER SURGICAL HISTORY      Cervical repair     No current outpatient medications on file.     No current facility-administered medications for this visit.     Social History     Socioeconomic History     Marital status: Single     Spouse name: Not on file     Number of children: Not on file     Years of education: Not on file     Highest education level: Not on file   Occupational History     Not on file   Tobacco Use     Smoking status: Never Smoker     Smokeless tobacco: Never Used   Substance and Sexual Activity     Alcohol use: Yes     Drug use: Not on file     Sexual activity: Yes     Partners: Male     Birth control/protection: OCP   Other Topics Concern     Not on file   Social History Narrative     Not on file     Social Determinants of Health     Financial Resource Strain: Not on file   Food Insecurity: Not on file    Transportation Needs: Not on file   Physical Activity: Not on file   Stress: Not on file   Social Connections: Not on file   Intimate Partner Violence: Not on file   Housing Stability: Not on file     Family History   Problem Relation Age of Onset     Cervical Cancer Paternal Grandmother         Post menopause     Liver Disease Father      Throat cancer Maternal Grandfather      Diabetes Mother      Hypertension Mother         ROS: 10 point ROS neg other than the symptoms noted above in the HPI.    PE:  B/P: 122/82, T: 98.8, P: Data Unavailable, R: Data Unavailable  General: well developed, well nourished F who appears their stated age  HEENT: NC/AT, EOMI, (-)icterus, (-)injection  Neck: Supple, No JVD  Chest: CTA  Heart: S1, S2, (-)m/r/g  Abd: Soft, minimal right upper quadrant tenderness to deep palpation., non distended, no masses  Ext; Warm, no edema  Psych: AAOx3  Neuro: No focal deficits    Labs obtained reviewed-LFTs normal    Ultrasound from [place] reviewed cholelithiasis but no cholecystitis    Impression/plan:  Is a 27-year-old lady with probable biliary colic.  I discussed the etiology of biliary colic as well as the role of cholecystectomy.  She expressed understanding.  After discussion with the patient plan at this time is for laparoscopic cholecystectomy.   The procedure, risks, benefits, and alternatives were discussed and the patient agrees to proceed.  She will be scheduled in the near future.  She knows to remain on low-fat diet in the meantime.    Renny Harris MD, FACS          Again, thank you for allowing me to participate in the care of your patient.        Sincerely,        Renny Harris MD

## 2022-08-11 ENCOUNTER — OFFICE VISIT (OUTPATIENT)
Dept: FAMILY MEDICINE | Facility: CLINIC | Age: 27
End: 2022-08-11
Payer: COMMERCIAL

## 2022-08-11 VITALS
DIASTOLIC BLOOD PRESSURE: 86 MMHG | BODY MASS INDEX: 41.32 KG/M2 | WEIGHT: 248 LBS | SYSTOLIC BLOOD PRESSURE: 110 MMHG | OXYGEN SATURATION: 98 % | TEMPERATURE: 98.1 F | RESPIRATION RATE: 16 BRPM | HEIGHT: 65 IN | HEART RATE: 92 BPM

## 2022-08-11 DIAGNOSIS — E66.01 MORBID OBESITY (H): ICD-10-CM

## 2022-08-11 DIAGNOSIS — Z01.818 PREOP GENERAL PHYSICAL EXAM: Primary | ICD-10-CM

## 2022-08-11 DIAGNOSIS — K81.0 ACUTE CHOLECYSTITIS: ICD-10-CM

## 2022-08-11 PROCEDURE — 99214 OFFICE O/P EST MOD 30 MIN: CPT | Performed by: FAMILY MEDICINE

## 2022-08-11 ASSESSMENT — PAIN SCALES - GENERAL: PAINLEVEL: MILD PAIN (3)

## 2022-08-11 NOTE — PATIENT INSTRUCTIONS
Preparing for Your Surgery  Getting started  A nurse will call you to review your health history and instructions. They will give you an arrival time based on your scheduled surgery time. Please be ready to share:    Your doctor's clinic name and phone number    Your medical, surgical and anesthesia history    A list of allergies and sensitivities    A list of medicines, including herbal treatments and over-the-counter drugs    Whether the patient has a legal guardian (ask how to send us the papers in advance)  Please tell us if you're pregnant--or if there's any chance you might be pregnant. Some surgeries may injure a fetus (unborn baby), so they require a pregnancy test. Surgeries that are safe for a fetus don't always need a test, and you can choose whether to have one.   If you have a child who's having surgery, please ask for a copy of Preparing for Your Child's Surgery.    Preparing for surgery    Within 30 days of surgery: Have a pre-op exam (sometimes called an H&P, or History and Physical). This can be done at a clinic or pre-operative center.  ? If you're having a , you may not need this exam. Talk to your care team.    At your pre-op exam, talk to your care team about all medicines you take. If you need to stop any medicines before surgery, ask when to start taking them again.  ? We do this for your safety. Many medicines can make you bleed too much during surgery. Some change how well surgery (anesthesia) drugs work.    Call your insurance company to let them know you're having surgery. (If you don't have insurance, call [phone].)    Call your clinic if there's any change in your health. This includes signs of a cold or flu (sore throat, runny nose, cough, rash, fever). It also includes a scrape or scratch near the surgery site.    If you have questions on the day of surgery, call your hospital or surgery center.  COVID testing  You may need to be tested for COVID-19 before having  surgery. If so, we will give you instructions.  Eating and drinking guidelines  For your safety: Unless your surgeon tells you otherwise, follow the guidelines below.    Eat and drink as usual until 8 hours before surgery. After that, no food or milk.    Drink clear liquids until 2 hours before surgery. These are liquids you can see through, like water, Gatorade and Propel Water. You may also have black coffee and tea (no cream or milk).    Nothing by mouth within 2 hours of surgery. This includes gum, candy and breath mints.    If you drink alcohol: Stop drinking it the night before surgery.    If your care team tells you to take medicine on the morning of surgery, it's okay to take it with a sip of water.  Preventing infection    Shower or bathe the night before and morning of your surgery. Follow the instructions your clinic gave you. (If no instructions, use regular soap.)    Don't shave or clip hair near your surgery site. We'll remove the hair if needed.    Don't smoke or vape the morning of surgery. You may chew nicotine gum up to 2 hours before surgery. A nicotine patch is okay.  ? Note: Some surgeries require you to completely quit smoking and nicotine. Check with your surgeon.    Your care team will make every effort to keep you safe from infection. We will:  ? Clean our hands often with soap and water (or an alcohol-based hand rub).  ? Clean the skin at your surgery site with a special soap that kills germs.  ? Give you a special gown to keep you warm. (Cold raises the risk of infection.)  ? Wear special hair covers, masks, gowns and gloves during surgery.  ? Give antibiotic medicine, if prescribed. Not all surgeries need antibiotics.  What to bring on the day of surgery    Photo ID and insurance card    Copy of your health care directive, if you have one    Glasses and hearing aides (bring cases)  ? You can't wear contacts during surgery    Inhaler and eye drops, if you use them (tell us about these when  you arrive)    CPAP machine or breathing device, if you use them    A few personal items, if spending the night    If you have . . .  ? A pacemaker, ICD (cardiac defibrillator) or other implant: Bring the ID card.  ? An implanted stimulator: Bring the remote control.  ? A legal guardian: Bring a copy of the certified (court-stamped) guardianship papers.  Please remove any jewelry, including body piercings. Leave jewelry and other valuables at home.  If you're going home the day of surgery    You must have a responsible adult drive you home. They should stay with you overnight as well.    If you don't have someone to stay with you, and you aren't safe to go home alone, we may keep you overnight. Insurance often won't pay for this.  After surgery  If it's hard to control your pain or you need more pain medicine, please call your surgeon's office.  Questions?   If you have any questions for your care team, list them here: _________________________________________________________________________________________________________________________________________________________________________ ____________________________________ ____________________________________ ____________________________________  For informational purposes only. Not to replace the advice of your health care provider. Copyright   2003, 2019 [facility]. All rights reserved. Clinically reviewed by Jazz Hebert MD. ADstruc [id number] - REV 07/21.

## 2022-08-11 NOTE — PROGRESS NOTES
[facility]  [address] [address]  Phone: [phone]  Primary Provider: Lynn Shell  Pre-op Performing Provider: JOHN MACDONALD      PREOPERATIVE EVALUATION:  Today's date: 8/11/2022    Carline Hart is a 27 year old female who presents for a preoperative evaluation.    Surgical Information:  Surgery/Procedure: Cholecystectomy, laparoscopic.  Surgery Location: [facility]  Surgeon: Dr. Harris  Surgery Date: 8-  Time of Surgery: Will call the week prior.  Will be in the am.  Where patient plans to recover: At home with family  Fax number for surgical facility: Note does not need to be faxed, will be available electronically in Epic.    Type of Anesthesia Anticipated: General    Assessment & Plan     The proposed surgical procedure is considered INTERMEDIATE risk.    Preop general physical exam  27-year-old female here for preop exam for gallbladder surgery.  Cleared for surgery.    Morbid obesity (H)  Ongoing.    Acute cholecystitis  Cleared for surgery.             Risks and Recommendations:  The patient has the following additional risks and recommendations for perioperative complications:   - No identified additional risk factors other than previously addressed    Medication Instructions:   - ibuprofen (Advil, Motrin): HOLD 1 day before surgery.    - naproxen (Aleve, Naprosyn): HOLD 4 days before surgery.     RECOMMENDATION:  APPROVAL GIVEN to proceed with proposed procedure, without further diagnostic evaluation.        [id number]}    Subjective     HPI related to upcoming procedure: Patient is a 27-year-old female here for a preop evaluation.  She is having gallbladder surgery on 8/31/2022.  Reports no chest pain or shortness of breath.  Reports no history of DVTs.  She is not on any routine medications.  Relatively healthy.  No other systemic symptoms reported today.    Preop Questions 8/9/2022   1. Have you ever had a heart  attack or stroke? No   2. Have you ever had surgery on your heart or blood vessels, such as a stent placement, a coronary artery bypass, or surgery on an artery in your head, neck, heart, or legs? No   3. Do you have chest pain with activity? No   4. Do you have a history of  heart failure? No   5. Do you currently have a cold, bronchitis or symptoms of other infection? No   6. Do you have a cough, shortness of breath, or wheezing? No   7. Do you or anyone in your family have previous history of blood clots? UNKNOWN -    8. Do you or does anyone in your family have a serious bleeding problem such as prolonged bleeding following surgeries or cuts? No   9. Have you ever had problems with anemia or been told to take iron pills? No   10. Have you had any abnormal blood loss such as black, tarry or bloody stools, or abnormal vaginal bleeding? UNKNOWN -    11. Have you ever had a blood transfusion? No   12. Are you willing to have a blood transfusion if it is medically needed before, during, or after your surgery? Yes   13. Have you or any of your relatives ever had problems with anesthesia? No   14. Do you have sleep apnea, excessive snoring or daytime drowsiness? UNKNOWN -    15. Do you have any artifical heart valves or other implanted medical devices like a pacemaker, defibrillator, or continuous glucose monitor? No   16. Do you have artificial joints? No   17. Are you allergic to latex? No   18. Is there any chance that you may be pregnant? No       Health Care Directive:  Patient does not have a Health Care Directive or Living Will: Discussed advance care planning with patient; however, patient declined at this time.    Preoperative Review of :   reviewed - no record of controlled substances prescribed.      Status of Chronic Conditions:  See problem list for active medical problems.  Problems all longstanding and stable, except as noted/documented.  See ROS for pertinent symptoms related to these  "conditions.      Review of Systems  CONSTITUTIONAL: NEGATIVE for fever, chills, change in weight  INTEGUMENTARY/SKIN: NEGATIVE for worrisome rashes, moles or lesions  EYES: NEGATIVE for vision changes or irritation  ENT/MOUTH: NEGATIVE for ear, mouth and throat problems  RESP: NEGATIVE for significant cough or SOB  CV: NEGATIVE for chest pain, palpitations or peripheral edema  GI: POSITIVE for abdominal pain RUQ and Hx gallbladder trouble  : NEGATIVE for frequency, dysuria, or hematuria  MUSCULOSKELETAL: NEGATIVE for significant arthralgias or myalgia  NEURO: NEGATIVE for weakness, dizziness or paresthesias  ENDOCRINE: NEGATIVE for temperature intolerance, skin/hair changes  HEME: NEGATIVE for bleeding problems  PSYCHIATRIC: NEGATIVE for changes in mood or affect    Patient Active Problem List    Diagnosis Date Noted     Morbid obesity (H) 02/02/2022     Priority: Medium     Vitamin D deficiency 06/25/2019     Priority: Medium     PCOS (polycystic ovarian syndrome) 11/21/2017     Priority: Medium      Past Medical History:   Diagnosis Date     Environmental allergies      Ovarian cyst      Past Surgical History:   Procedure Laterality Date     OTHER SURGICAL HISTORY      Cervical repair     No current outpatient medications on file.       Allergies   Allergen Reactions     Other Environmental Allergy Unknown        Social History     Tobacco Use     Smoking status: Never Smoker     Smokeless tobacco: Never Used   Substance Use Topics     Alcohol use: Yes     Comment: Rare use.     Family History   Problem Relation Age of Onset     Cervical Cancer Paternal Grandmother         Post menopause     Liver Disease Father      Throat cancer Maternal Grandfather      Diabetes Mother      Hypertension Mother      History   Drug Use Unknown         Objective     /86   Pulse 92   Temp 98.1  F (36.7  C) (Tympanic)   Resp 16   Ht 1.651 m (5' 5\")   Wt 112.5 kg (248 lb)   LMP 07/14/2022 (LMP Unknown)   SpO2 98%   " BMI 41.27 kg/m      Physical Exam    GENERAL APPEARANCE: healthy, alert and no distress     EYES: EOMI, PERRL     HENT: ear canals and TM's normal and nose and mouth without ulcers or lesions     NECK: no adenopathy, no asymmetry, masses, or scars and thyroid normal to palpation     RESP: lungs clear to auscultation - no rales, rhonchi or wheezes     CV: regular rates and rhythm, normal S1 S2, no S3 or S4 and no murmur, click or rub     ABDOMEN:  soft, nontender, no HSM or masses and bowel sounds normal     MS: extremities normal- no gross deformities noted, no evidence of inflammation in joints, FROM in all extremities.     SKIN: no suspicious lesions or rashes     PSYCH: mentation appears normal. and affect normal/bright     LYMPHATICS: No cervical adenopathy    No results for input(s): HGB, PLT, INR, NA, POTASSIUM, CR, A1C in the last 93907 hours.     Diagnostics:  No labs were ordered during this visit.   No EKG required, no history of coronary heart disease, significant arrhythmia, peripheral arterial disease or other structural heart disease.    Revised Cardiac Risk Index (RCRI):  The patient has the following serious cardiovascular risks for perioperative complications:   - No serious cardiac risks = 0 points     RCRI Interpretation: 0 points: Class I (very low risk - 0.4% complication rate)           Signed Electronically by: Chele Funez MD  Copy of this evaluation report is provided to requesting physician.

## 2022-08-11 NOTE — H&P (VIEW-ONLY)
[facility]  [address] [address]  Phone: [phone]  Primary Provider: Lynn Shell  Pre-op Performing Provider: JOHN MACDONALD      PREOPERATIVE EVALUATION:  Today's date: 8/11/2022    Carline Hart is a 27 year old female who presents for a preoperative evaluation.    Surgical Information:  Surgery/Procedure: Cholecystectomy, laparoscopic.  Surgery Location: [facility]  Surgeon: Dr. Harris  Surgery Date: 8-  Time of Surgery: Will call the week prior.  Will be in the am.  Where patient plans to recover: At home with family  Fax number for surgical facility: Note does not need to be faxed, will be available electronically in Epic.    Type of Anesthesia Anticipated: General    Assessment & Plan     The proposed surgical procedure is considered INTERMEDIATE risk.    Preop general physical exam  27-year-old female here for preop exam for gallbladder surgery.  Cleared for surgery.    Morbid obesity (H)  Ongoing.    Acute cholecystitis  Cleared for surgery.             Risks and Recommendations:  The patient has the following additional risks and recommendations for perioperative complications:   - No identified additional risk factors other than previously addressed    Medication Instructions:   - ibuprofen (Advil, Motrin): HOLD 1 day before surgery.    - naproxen (Aleve, Naprosyn): HOLD 4 days before surgery.     RECOMMENDATION:  APPROVAL GIVEN to proceed with proposed procedure, without further diagnostic evaluation.        [id number]}    Subjective     HPI related to upcoming procedure: Patient is a 27-year-old female here for a preop evaluation.  She is having gallbladder surgery on 8/31/2022.  Reports no chest pain or shortness of breath.  Reports no history of DVTs.  She is not on any routine medications.  Relatively healthy.  No other systemic symptoms reported today.    Preop Questions 8/9/2022   1. Have you ever had a heart  attack or stroke? No   2. Have you ever had surgery on your heart or blood vessels, such as a stent placement, a coronary artery bypass, or surgery on an artery in your head, neck, heart, or legs? No   3. Do you have chest pain with activity? No   4. Do you have a history of  heart failure? No   5. Do you currently have a cold, bronchitis or symptoms of other infection? No   6. Do you have a cough, shortness of breath, or wheezing? No   7. Do you or anyone in your family have previous history of blood clots? UNKNOWN -    8. Do you or does anyone in your family have a serious bleeding problem such as prolonged bleeding following surgeries or cuts? No   9. Have you ever had problems with anemia or been told to take iron pills? No   10. Have you had any abnormal blood loss such as black, tarry or bloody stools, or abnormal vaginal bleeding? UNKNOWN -    11. Have you ever had a blood transfusion? No   12. Are you willing to have a blood transfusion if it is medically needed before, during, or after your surgery? Yes   13. Have you or any of your relatives ever had problems with anesthesia? No   14. Do you have sleep apnea, excessive snoring or daytime drowsiness? UNKNOWN -    15. Do you have any artifical heart valves or other implanted medical devices like a pacemaker, defibrillator, or continuous glucose monitor? No   16. Do you have artificial joints? No   17. Are you allergic to latex? No   18. Is there any chance that you may be pregnant? No       Health Care Directive:  Patient does not have a Health Care Directive or Living Will: Discussed advance care planning with patient; however, patient declined at this time.    Preoperative Review of :   reviewed - no record of controlled substances prescribed.      Status of Chronic Conditions:  See problem list for active medical problems.  Problems all longstanding and stable, except as noted/documented.  See ROS for pertinent symptoms related to these  "conditions.      Review of Systems  CONSTITUTIONAL: NEGATIVE for fever, chills, change in weight  INTEGUMENTARY/SKIN: NEGATIVE for worrisome rashes, moles or lesions  EYES: NEGATIVE for vision changes or irritation  ENT/MOUTH: NEGATIVE for ear, mouth and throat problems  RESP: NEGATIVE for significant cough or SOB  CV: NEGATIVE for chest pain, palpitations or peripheral edema  GI: POSITIVE for abdominal pain RUQ and Hx gallbladder trouble  : NEGATIVE for frequency, dysuria, or hematuria  MUSCULOSKELETAL: NEGATIVE for significant arthralgias or myalgia  NEURO: NEGATIVE for weakness, dizziness or paresthesias  ENDOCRINE: NEGATIVE for temperature intolerance, skin/hair changes  HEME: NEGATIVE for bleeding problems  PSYCHIATRIC: NEGATIVE for changes in mood or affect    Patient Active Problem List    Diagnosis Date Noted     Morbid obesity (H) 02/02/2022     Priority: Medium     Vitamin D deficiency 06/25/2019     Priority: Medium     PCOS (polycystic ovarian syndrome) 11/21/2017     Priority: Medium      Past Medical History:   Diagnosis Date     Environmental allergies      Ovarian cyst      Past Surgical History:   Procedure Laterality Date     OTHER SURGICAL HISTORY      Cervical repair     No current outpatient medications on file.       Allergies   Allergen Reactions     Other Environmental Allergy Unknown        Social History     Tobacco Use     Smoking status: Never Smoker     Smokeless tobacco: Never Used   Substance Use Topics     Alcohol use: Yes     Comment: Rare use.     Family History   Problem Relation Age of Onset     Cervical Cancer Paternal Grandmother         Post menopause     Liver Disease Father      Throat cancer Maternal Grandfather      Diabetes Mother      Hypertension Mother      History   Drug Use Unknown         Objective     /86   Pulse 92   Temp 98.1  F (36.7  C) (Tympanic)   Resp 16   Ht 1.651 m (5' 5\")   Wt 112.5 kg (248 lb)   LMP 07/14/2022 (LMP Unknown)   SpO2 98%   " BMI 41.27 kg/m      Physical Exam    GENERAL APPEARANCE: healthy, alert and no distress     EYES: EOMI, PERRL     HENT: ear canals and TM's normal and nose and mouth without ulcers or lesions     NECK: no adenopathy, no asymmetry, masses, or scars and thyroid normal to palpation     RESP: lungs clear to auscultation - no rales, rhonchi or wheezes     CV: regular rates and rhythm, normal S1 S2, no S3 or S4 and no murmur, click or rub     ABDOMEN:  soft, nontender, no HSM or masses and bowel sounds normal     MS: extremities normal- no gross deformities noted, no evidence of inflammation in joints, FROM in all extremities.     SKIN: no suspicious lesions or rashes     PSYCH: mentation appears normal. and affect normal/bright     LYMPHATICS: No cervical adenopathy    No results for input(s): HGB, PLT, INR, NA, POTASSIUM, CR, A1C in the last 29933 hours.     Diagnostics:  No labs were ordered during this visit.   No EKG required, no history of coronary heart disease, significant arrhythmia, peripheral arterial disease or other structural heart disease.    Revised Cardiac Risk Index (RCRI):  The patient has the following serious cardiovascular risks for perioperative complications:   - No serious cardiac risks = 0 points     RCRI Interpretation: 0 points: Class I (very low risk - 0.4% complication rate)           Signed Electronically by: Chele Funez MD  Copy of this evaluation report is provided to requesting physician.

## 2022-08-30 ENCOUNTER — ANESTHESIA EVENT (OUTPATIENT)
Dept: SURGERY | Facility: CLINIC | Age: 27
End: 2022-08-30
Payer: COMMERCIAL

## 2022-08-31 ENCOUNTER — NURSE TRIAGE (OUTPATIENT)
Dept: NURSING | Facility: CLINIC | Age: 27
End: 2022-08-31

## 2022-08-31 ENCOUNTER — TELEPHONE (OUTPATIENT)
Dept: SURGERY | Facility: OTHER | Age: 27
End: 2022-08-31

## 2022-08-31 ENCOUNTER — HOSPITAL ENCOUNTER (OUTPATIENT)
Facility: CLINIC | Age: 27
Discharge: HOME | End: 2022-08-31
Attending: SPECIALIST | Admitting: SPECIALIST
Payer: COMMERCIAL

## 2022-08-31 ENCOUNTER — ANESTHESIA (OUTPATIENT)
Dept: SURGERY | Facility: CLINIC | Age: 27
End: 2022-08-31
Payer: COMMERCIAL

## 2022-08-31 VITALS
TEMPERATURE: 98.4 F | OXYGEN SATURATION: 99 % | DIASTOLIC BLOOD PRESSURE: 71 MMHG | SYSTOLIC BLOOD PRESSURE: 129 MMHG | WEIGHT: 248 LBS | RESPIRATION RATE: 20 BRPM | BODY MASS INDEX: 41.32 KG/M2 | HEART RATE: 89 BPM | HEIGHT: 65 IN

## 2022-08-31 DIAGNOSIS — G89.18 POST-OP PAIN: Primary | ICD-10-CM

## 2022-08-31 DIAGNOSIS — Z90.49 S/P LAPAROSCOPIC CHOLECYSTECTOMY: ICD-10-CM

## 2022-08-31 DIAGNOSIS — Z90.49 S/P LAPAROSCOPIC CHOLECYSTECTOMY: Primary | ICD-10-CM

## 2022-08-31 PROCEDURE — 250N000009 HC RX 250: Performed by: SPECIALIST

## 2022-08-31 PROCEDURE — 250N000009 HC RX 250: Performed by: NURSE ANESTHETIST, CERTIFIED REGISTERED

## 2022-08-31 PROCEDURE — 88304 TISSUE EXAM BY PATHOLOGIST: CPT | Mod: TC | Performed by: SPECIALIST

## 2022-08-31 PROCEDURE — 710N000010 HC RECOVERY PHASE 1, LEVEL 2, PER MIN: Performed by: SPECIALIST

## 2022-08-31 PROCEDURE — 258N000003 HC RX IP 258 OP 636: Performed by: NURSE ANESTHETIST, CERTIFIED REGISTERED

## 2022-08-31 PROCEDURE — 250N000025 HC SEVOFLURANE, PER MIN: Performed by: SPECIALIST

## 2022-08-31 PROCEDURE — 250N000013 HC RX MED GY IP 250 OP 250 PS 637: Performed by: SPECIALIST

## 2022-08-31 PROCEDURE — 360N000076 HC SURGERY LEVEL 3, PER MIN: Performed by: SPECIALIST

## 2022-08-31 PROCEDURE — 250N000011 HC RX IP 250 OP 636: Performed by: NURSE ANESTHETIST, CERTIFIED REGISTERED

## 2022-08-31 PROCEDURE — 272N000001 HC OR GENERAL SUPPLY STERILE: Performed by: SPECIALIST

## 2022-08-31 PROCEDURE — 370N000017 HC ANESTHESIA TECHNICAL FEE, PER MIN: Performed by: SPECIALIST

## 2022-08-31 PROCEDURE — 999N000141 HC STATISTIC PRE-PROCEDURE NURSING ASSESSMENT: Performed by: SPECIALIST

## 2022-08-31 PROCEDURE — 250N000026 HC DESFLURANE, PER MIN: Performed by: SPECIALIST

## 2022-08-31 PROCEDURE — 710N000012 HC RECOVERY PHASE 2, PER MINUTE: Performed by: SPECIALIST

## 2022-08-31 PROCEDURE — 250N000011 HC RX IP 250 OP 636: Performed by: SPECIALIST

## 2022-08-31 PROCEDURE — 47562 LAPAROSCOPIC CHOLECYSTECTOMY: CPT | Performed by: SPECIALIST

## 2022-08-31 RX ORDER — LIDOCAINE 40 MG/G
CREAM TOPICAL
Status: DISCONTINUED | OUTPATIENT
Start: 2022-08-31 | End: 2022-08-31 | Stop reason: HOSPADM

## 2022-08-31 RX ORDER — ONDANSETRON 4 MG/1
4 TABLET, ORALLY DISINTEGRATING ORAL EVERY 30 MIN PRN
Status: COMPLETED | OUTPATIENT
Start: 2022-08-31 | End: 2022-08-31

## 2022-08-31 RX ORDER — OXYCODONE HYDROCHLORIDE 5 MG/1
5 TABLET ORAL EVERY 4 HOURS PRN
Status: DISCONTINUED | OUTPATIENT
Start: 2022-08-31 | End: 2022-08-31 | Stop reason: HOSPADM

## 2022-08-31 RX ORDER — FENTANYL CITRATE 50 UG/ML
50 INJECTION, SOLUTION INTRAMUSCULAR; INTRAVENOUS EVERY 5 MIN PRN
Status: DISCONTINUED | OUTPATIENT
Start: 2022-08-31 | End: 2022-08-31 | Stop reason: HOSPADM

## 2022-08-31 RX ORDER — SODIUM CHLORIDE, SODIUM LACTATE, POTASSIUM CHLORIDE, CALCIUM CHLORIDE 600; 310; 30; 20 MG/100ML; MG/100ML; MG/100ML; MG/100ML
INJECTION, SOLUTION INTRAVENOUS CONTINUOUS PRN
Status: DISCONTINUED | OUTPATIENT
Start: 2022-08-31 | End: 2022-08-31

## 2022-08-31 RX ORDER — FENTANYL CITRATE 50 UG/ML
50 INJECTION, SOLUTION INTRAMUSCULAR; INTRAVENOUS
Status: DISCONTINUED | OUTPATIENT
Start: 2022-08-31 | End: 2022-08-31 | Stop reason: HOSPADM

## 2022-08-31 RX ORDER — ONDANSETRON 4 MG/1
4 TABLET, ORALLY DISINTEGRATING ORAL EVERY 6 HOURS PRN
Qty: 12 TABLET | Refills: 0 | Status: SHIPPED | OUTPATIENT
Start: 2022-08-31 | End: 2022-09-08

## 2022-08-31 RX ORDER — OXYCODONE HYDROCHLORIDE 5 MG/1
5-10 TABLET ORAL EVERY 6 HOURS PRN
Qty: 10 TABLET | Refills: 0 | Status: SHIPPED | OUTPATIENT
Start: 2022-08-31 | End: 2022-09-08

## 2022-08-31 RX ORDER — HYDROMORPHONE HYDROCHLORIDE 1 MG/ML
0.4 INJECTION, SOLUTION INTRAMUSCULAR; INTRAVENOUS; SUBCUTANEOUS EVERY 5 MIN PRN
Status: DISCONTINUED | OUTPATIENT
Start: 2022-08-31 | End: 2022-08-31 | Stop reason: HOSPADM

## 2022-08-31 RX ORDER — DEXAMETHASONE SODIUM PHOSPHATE 10 MG/ML
INJECTION, SOLUTION INTRAMUSCULAR; INTRAVENOUS PRN
Status: DISCONTINUED | OUTPATIENT
Start: 2022-08-31 | End: 2022-08-31

## 2022-08-31 RX ORDER — MEPERIDINE HYDROCHLORIDE 25 MG/ML
12.5 INJECTION INTRAMUSCULAR; INTRAVENOUS; SUBCUTANEOUS
Status: COMPLETED | OUTPATIENT
Start: 2022-08-31 | End: 2022-08-31

## 2022-08-31 RX ORDER — ONDANSETRON 2 MG/ML
INJECTION INTRAMUSCULAR; INTRAVENOUS PRN
Status: DISCONTINUED | OUTPATIENT
Start: 2022-08-31 | End: 2022-08-31

## 2022-08-31 RX ORDER — SODIUM CHLORIDE, SODIUM LACTATE, POTASSIUM CHLORIDE, CALCIUM CHLORIDE 600; 310; 30; 20 MG/100ML; MG/100ML; MG/100ML; MG/100ML
INJECTION, SOLUTION INTRAVENOUS CONTINUOUS
Status: DISCONTINUED | OUTPATIENT
Start: 2022-08-31 | End: 2022-08-31 | Stop reason: HOSPADM

## 2022-08-31 RX ORDER — ONDANSETRON 2 MG/ML
4 INJECTION INTRAMUSCULAR; INTRAVENOUS EVERY 30 MIN PRN
Status: COMPLETED | OUTPATIENT
Start: 2022-08-31 | End: 2022-08-31

## 2022-08-31 RX ORDER — PROPOFOL 10 MG/ML
INJECTION, EMULSION INTRAVENOUS PRN
Status: DISCONTINUED | OUTPATIENT
Start: 2022-08-31 | End: 2022-08-31

## 2022-08-31 RX ORDER — OXYCODONE AND ACETAMINOPHEN 5; 325 MG/1; MG/1
2 TABLET ORAL
Status: COMPLETED | OUTPATIENT
Start: 2022-08-31 | End: 2022-08-31

## 2022-08-31 RX ORDER — KETOROLAC TROMETHAMINE 30 MG/ML
INJECTION, SOLUTION INTRAMUSCULAR; INTRAVENOUS PRN
Status: DISCONTINUED | OUTPATIENT
Start: 2022-08-31 | End: 2022-08-31

## 2022-08-31 RX ORDER — LIDOCAINE HYDROCHLORIDE 20 MG/ML
INJECTION, SOLUTION INFILTRATION; PERINEURAL PRN
Status: DISCONTINUED | OUTPATIENT
Start: 2022-08-31 | End: 2022-08-31

## 2022-08-31 RX ORDER — FENTANYL CITRATE 50 UG/ML
INJECTION, SOLUTION INTRAMUSCULAR; INTRAVENOUS PRN
Status: DISCONTINUED | OUTPATIENT
Start: 2022-08-31 | End: 2022-08-31

## 2022-08-31 RX ORDER — BUPIVACAINE HYDROCHLORIDE AND EPINEPHRINE 2.5; 5 MG/ML; UG/ML
INJECTION, SOLUTION INFILTRATION; PERINEURAL PRN
Status: DISCONTINUED | OUTPATIENT
Start: 2022-08-31 | End: 2022-08-31 | Stop reason: HOSPADM

## 2022-08-31 RX ORDER — DIMENHYDRINATE 50 MG/ML
25 INJECTION, SOLUTION INTRAMUSCULAR; INTRAVENOUS
Status: DISCONTINUED | OUTPATIENT
Start: 2022-08-31 | End: 2022-08-31 | Stop reason: HOSPADM

## 2022-08-31 RX ORDER — CEFAZOLIN SODIUM/WATER 2 G/20 ML
2 SYRINGE (ML) INTRAVENOUS
Status: COMPLETED | OUTPATIENT
Start: 2022-08-31 | End: 2022-08-31

## 2022-08-31 RX ORDER — CEFAZOLIN SODIUM/WATER 2 G/20 ML
2 SYRINGE (ML) INTRAVENOUS SEE ADMIN INSTRUCTIONS
Status: DISCONTINUED | OUTPATIENT
Start: 2022-08-31 | End: 2022-08-31 | Stop reason: HOSPADM

## 2022-08-31 RX ADMIN — FENTANYL CITRATE 50 MCG: 50 INJECTION, SOLUTION INTRAMUSCULAR; INTRAVENOUS at 07:25

## 2022-08-31 RX ADMIN — MEPERIDINE HYDROCHLORIDE 12.5 MG: 25 INJECTION INTRAMUSCULAR; INTRAVENOUS; SUBCUTANEOUS at 09:56

## 2022-08-31 RX ADMIN — SODIUM CHLORIDE, POTASSIUM CHLORIDE, SODIUM LACTATE AND CALCIUM CHLORIDE: 600; 310; 30; 20 INJECTION, SOLUTION INTRAVENOUS at 08:39

## 2022-08-31 RX ADMIN — DEXAMETHASONE SODIUM PHOSPHATE 10 MG: 10 INJECTION, SOLUTION INTRAMUSCULAR; INTRAVENOUS at 07:37

## 2022-08-31 RX ADMIN — ROCURONIUM BROMIDE 50 MG: 50 INJECTION, SOLUTION INTRAVENOUS at 07:27

## 2022-08-31 RX ADMIN — SODIUM CHLORIDE, POTASSIUM CHLORIDE, SODIUM LACTATE AND CALCIUM CHLORIDE: 600; 310; 30; 20 INJECTION, SOLUTION INTRAVENOUS at 06:44

## 2022-08-31 RX ADMIN — HYDROMORPHONE HYDROCHLORIDE 0.5 MG: 1 INJECTION, SOLUTION INTRAMUSCULAR; INTRAVENOUS; SUBCUTANEOUS at 07:51

## 2022-08-31 RX ADMIN — LIDOCAINE HYDROCHLORIDE 0.1 ML: 10 INJECTION, SOLUTION EPIDURAL; INFILTRATION; INTRACAUDAL; PERINEURAL at 06:43

## 2022-08-31 RX ADMIN — ONDANSETRON 4 MG: 2 INJECTION INTRAMUSCULAR; INTRAVENOUS at 09:40

## 2022-08-31 RX ADMIN — MIDAZOLAM 2 MG: 1 INJECTION INTRAMUSCULAR; INTRAVENOUS at 07:23

## 2022-08-31 RX ADMIN — HYDROMORPHONE HYDROCHLORIDE 0.5 MG: 1 INJECTION, SOLUTION INTRAMUSCULAR; INTRAVENOUS; SUBCUTANEOUS at 08:01

## 2022-08-31 RX ADMIN — ROCURONIUM BROMIDE 20 MG: 50 INJECTION, SOLUTION INTRAVENOUS at 08:23

## 2022-08-31 RX ADMIN — PROPOFOL 50 MG: 10 INJECTION, EMULSION INTRAVENOUS at 08:25

## 2022-08-31 RX ADMIN — ONDANSETRON 4 MG: 2 INJECTION INTRAMUSCULAR; INTRAVENOUS at 11:15

## 2022-08-31 RX ADMIN — KETOROLAC TROMETHAMINE 30 MG: 30 INJECTION, SOLUTION INTRAMUSCULAR at 08:58

## 2022-08-31 RX ADMIN — MEPERIDINE HYDROCHLORIDE 12.5 MG: 25 INJECTION INTRAMUSCULAR; INTRAVENOUS; SUBCUTANEOUS at 09:41

## 2022-08-31 RX ADMIN — FENTANYL CITRATE 50 MCG: 50 INJECTION, SOLUTION INTRAMUSCULAR; INTRAVENOUS at 07:48

## 2022-08-31 RX ADMIN — Medication 2 G: at 07:25

## 2022-08-31 RX ADMIN — OXYCODONE HYDROCHLORIDE AND ACETAMINOPHEN 2 TABLET: 5; 325 TABLET ORAL at 10:03

## 2022-08-31 RX ADMIN — FENTANYL CITRATE 50 MCG: 50 INJECTION, SOLUTION INTRAMUSCULAR; INTRAVENOUS at 09:44

## 2022-08-31 RX ADMIN — SODIUM CHLORIDE, POTASSIUM CHLORIDE, SODIUM LACTATE AND CALCIUM CHLORIDE: 600; 310; 30; 20 INJECTION, SOLUTION INTRAVENOUS at 07:25

## 2022-08-31 RX ADMIN — LIDOCAINE HYDROCHLORIDE 80 MG: 20 INJECTION, SOLUTION INFILTRATION; PERINEURAL at 07:27

## 2022-08-31 RX ADMIN — PROPOFOL 200 MG: 10 INJECTION, EMULSION INTRAVENOUS at 07:27

## 2022-08-31 RX ADMIN — ONDANSETRON 4 MG: 2 INJECTION INTRAMUSCULAR; INTRAVENOUS at 08:53

## 2022-08-31 ASSESSMENT — ACTIVITIES OF DAILY LIVING (ADL)
ADLS_ACUITY_SCORE: 35

## 2022-08-31 NOTE — ANESTHESIA CARE TRANSFER NOTE
Patient: Carline Hart    Procedure: Procedure(s):  CHOLECYSTECTOMY, LAPAROSCOPIC       Diagnosis: Biliary colic [K80.50]  Diagnosis Additional Information: No value filed.    Anesthesia Type:   General     Note:    Oropharynx: oropharynx clear of all foreign objects and spontaneously breathing  Level of Consciousness: drowsy  Oxygen Supplementation: face mask    Independent Airway: airway patency satisfactory and stable  Dentition: dentition unchanged  Vital Signs Stable: post-procedure vital signs reviewed and stable  Report to RN Given: handoff report given  Patient transferred to: PACU    Handoff Report: Identifed the Patient, Identified the Reponsible Provider, Reviewed the pertinent medical history, Discussed the surgical course, Reviewed Intra-OP anesthesia mangement and issues during anesthesia, Set expectations for post-procedure period and Allowed opportunity for questions and acknowledgement of understanding      Vitals:  Vitals Value Taken Time   BP     Temp     Pulse 100 08/31/22 0914   Resp 23 08/31/22 0914   SpO2 99 % 08/31/22 0914   Vitals shown include unvalidated device data.    Electronically Signed By: SHIRA Felipe CRNA  August 31, 2022  9:15 AM

## 2022-08-31 NOTE — ANESTHESIA PROCEDURE NOTES
Airway       Patient location during procedure: OR       Procedure Start/Stop Times: 8/31/2022 7:30 AM  Staff -        CRNA: Mindi Weeks APRN CRNA       Performed By: CRNA  Consent for Airway        Urgency: elective  Indications and Patient Condition       Indications for airway management: francisco-procedural       Induction type:intravenous       Mask difficulty assessment: 1 - vent by mask    Final Airway Details       Final airway type: endotracheal airway       Successful airway: ETT - single  Endotracheal Airway Details        ETT size (mm): 6.5       Cuffed: yes       Successful intubation technique: direct laryngoscopy       DL Blade Type: Oliver 3       Grade View of Cords: 1       Adjucts: stylet       Position: Right       Measured from: lips       Bite block used: Oral Airway    Post intubation assessment        Placement verified by: capnometry, equal breath sounds and chest rise        Number of attempts at approach: 1       Number of other approaches attempted: 0       Secured with: plastic tape       Ease of procedure: easy       Dentition: Intact and Unchanged    Medication(s) Administered   Medication Administration Time: 8/31/2022 7:30 AM

## 2022-08-31 NOTE — INTERVAL H&P NOTE
"I have reviewed the surgical (or preoperative) H&P that is linked to this encounter, and examined the patient. There are no significant changes    Clinical Conditions Present on Arrival:  Clinically Significant Risk Factors Present on Admission                   # Severe Obesity: Estimated body mass index is 41.27 kg/m  as calculated from the following:    Height as of this encounter: 1.651 m (5' 5\").    Weight as of this encounter: 112.5 kg (248 lb).       "

## 2022-08-31 NOTE — TELEPHONE ENCOUNTER
Reason for Call:  Other call back and prescription    Detailed comments: pt feel;ing nauseous after surgery this morning and wondering if she can get dissolving Zofran to help with it. Please call pt to discuss or once prescription is sent in. Thank you    Phone Number Patient can be reached at: Home number on file [phone] (home)    Best Time: any    Can we leave a detailed message on this number? YES    Call taken on 8/31/2022 at 4:40 PM by Lita Li

## 2022-08-31 NOTE — BRIEF OP NOTE
[facility]    Brief Operative Note    Pre-operative diagnosis: Biliary colic [K80.50]  Post-operative diagnosis Same as pre-operative diagnosis, chronic cholecystitis    Procedure: Procedure(s):  CHOLECYSTECTOMY, LAPAROSCOPIC  Surgeon: Surgeon(s) and Role:     * Renny Harris MD - Primary  Anesthesia: General   Estimated Blood Loss: Less than 10 ml    Drains: None  Specimens:   ID Type Source Tests Collected by Time Destination   1 : gallbladder and contents Tissue Gallbladder SURGICAL PATHOLOGY EXAM Renny Harris MD 8/31/2022  8:24 AM      Findings:   Omental ahdesions to gallbladder, multiple stones.  Complications: None.  Implants: * No implants in log *    Renny Harris MD, FACS    #[id number]

## 2022-08-31 NOTE — OP NOTE
Procedure Date: 08/31/2022    PREOPERATIVE DIAGNOSIS:  Biliary colic.    POSTOPERATIVE DIAGNOSIS:  Biliary colic, chronic cholecystitis.    PROCEDURE PERFORMED:  Laparoscopic cholecystectomy.    SURGEON:  Renny Harris M.D.     ANESTHESIA:  General via endotracheal tube.    INDICATIONS FOR PROCEDURE:  This is a 27-year-old lady who presented with multiple episodes of right upper quadrant pain.  Subsequent ultrasound revealed gallstones, and for this reason, we elected to take her to the operating room for laparoscopic cholecystectomy.    OPERATIVE FINDINGS:  Included omental adhesions to the gallbladder and multiple stones.    DESCRIPTION OF PROCEDURE:  The patient was taken to the operating room and placed on the table in supine position.  After induction of anesthesia, the abdomen was prepped and draped in sterile fashion.  A timeout was performed confirming the day and the patient, as well as the procedure to be performed.  A supraumbilical incision was then made.  An 11 mm Visiport was inserted in the abdomen under direct visualization and the abdomen was insufflated to 15 mmHg pressure.  Generalized inspection of the abdomen was then carried out was no evidence of any injury from the Visiport.  Two right upper quadrant 5 mm trocars and an 11 mm subxiphoid trocar were placed.  The gallbladder was grasped, pulled up.  There were omental adhesions to the gallbladder.  These were taken down with a combination of L hook and [place] dissector.  After freeing down to the base of the gallbladder, it was picked up, the peritoneum over the medial and lateral aspects of the gallbladder was taken down using L hook.  During this dissection, the cystic artery was identified and cauterized.  After creating a window into the base of gallbladder, could identify the cystic duct.  There was a stone just at the entrance of the cystic duct.  This was milked back up to the gallbladder and a clip was placed at the gallbladder.   The remainder of the cystic duct was clipped and transected.  The gallbladder was removed from the liver bed using cautery.  During this dissection, there was a bleeding area in the upper aspect of the liver.  This required extensive cauterization and then a piece of Surgicel was placed into the liver bed and held under pressure for 5 minutes by the clock.  It was removed.  There was no evidence of any further bleeding.  The remainder of the gallbladder was removed from the liver bed using cautery and the abdomen using an EndoCatch bag.  The liver bed was reinspected.  There was no evidence of any bleeding.  The area was then copiously irrigated.  All fluid was suctioned out and one last inspection of the liver bed was done.  Again, there was no evidence of any bleeding.  The subxiphoid trocar was removed.  The fascia was closed using a cone PMI needle and #1 PDS.  The two 5 mm trocars were removed without evidence of any bleeding.  The supraumbilical trocar was removed, and the fascia was closed using a single stitch #1 PDS.  All skin incisions were closed using 3-0 Vicryl and Exofin glue.  The patient was then taken from the operating room to recovery in stable condition to be sent home.    Renny Harris MD, FACS        D: 2022   T: 2022   MT: JERICHO    Name:     DANG BHATTI  MRN:      [id number]        Account:        [id number]   :      1995           Procedure Date: 2022     Document: [id number]

## 2022-08-31 NOTE — ANESTHESIA PREPROCEDURE EVALUATION
Anesthesia Pre-Procedure Evaluation    Patient: Carline Hart   MRN: [id number] : 1995        Procedure : Procedure(s):  CHOLECYSTECTOMY, LAPAROSCOPIC          Past Medical History:   Diagnosis Date     Environmental allergies      Ovarian cyst       Past Surgical History:   Procedure Laterality Date     OTHER SURGICAL HISTORY      Cervical repair      Allergies   Allergen Reactions     Other Environmental Allergy Unknown      Social History     Tobacco Use     Smoking status: Never Smoker     Smokeless tobacco: Never Used   Substance Use Topics     Alcohol use: Yes     Comment: Rare use.      Wt Readings from Last 1 Encounters:   22 112.5 kg (248 lb)        Anesthesia Evaluation   Pt has not had prior anesthetic         ROS/MED HX  ENT/Pulmonary:  - neg pulmonary ROS     Neurologic:  - neg neurologic ROS     Cardiovascular:  - neg cardiovascular ROS   (+) -----Previous cardiac testing   Echo: Date: Results:    Stress Test: Date: Results:    ECG Reviewed: Date:  Results:  SR  Cath: Date: Results:      METS/Exercise Tolerance:     Hematologic:  - neg hematologic  ROS     Musculoskeletal:  - neg musculoskeletal ROS     GI/Hepatic:     (+) cholecystitis/cholelithiasis,     Renal/Genitourinary:  - neg Renal ROS     Endo:  - neg endo ROS   (+) Obesity,     Psychiatric/Substance Use:  - neg psychiatric ROS     Infectious Disease:  - neg infectious disease ROS     Malignancy:  - neg malignancy ROS     Other:  - neg other ROS          Physical Exam    Airway  airway exam normal      Mallampati: II   TM distance: > 3 FB   Neck ROM: full   Mouth opening: > 3 cm    Respiratory Devices and Support         Dental  no notable dental history         Cardiovascular   cardiovascular exam normal       Rhythm and rate: regular and normal     Pulmonary   pulmonary exam normal        breath sounds clear to auscultation           OUTSIDE LABS:  CBC:   Lab Results   Component Value Date    WBC 7.5 2020    WBC 7.9  02/04/2020    HGB 14.2 04/07/2020    HGB 13.8 02/04/2020    HCT 41.3 04/07/2020    HCT 39.2 02/04/2020     04/07/2020     02/04/2020     BMP:   Lab Results   Component Value Date     04/07/2020     02/04/2020    POTASSIUM 4.2 04/07/2020    POTASSIUM 4.1 02/04/2020    CHLORIDE 105 04/07/2020    CHLORIDE 107 02/04/2020    CO2 24 04/07/2020    CO2 26 02/04/2020    BUN 11 04/07/2020    BUN 10 02/04/2020    CR 0.70 04/07/2020    CR 0.78 02/04/2020    GLC 80 04/07/2020     02/04/2020     COAGS: No results found for: PTT, INR, FIBR  POC: No results found for: BGM, HCG, HCGS  HEPATIC:   Lab Results   Component Value Date    ALBUMIN 4.1 04/07/2020    PROTTOTAL 7.0 04/07/2020    ALT 22 04/07/2020    AST 18 04/07/2020    ALKPHOS 84 04/07/2020    BILITOTAL 0.4 04/07/2020     OTHER:   Lab Results   Component Value Date    WIN 9.1 04/07/2020    TSH 1.28 04/07/2020       Anesthesia Plan    ASA Status:  2   NPO Status:  NPO Appropriate    Anesthesia Type: General.     - Airway: ETT   Induction: Intravenous, Propofol.   Maintenance: Balanced.        Consents    Anesthesia Plan(s) and associated risks, benefits, and realistic alternatives discussed. Questions answered and patient/representative(s) expressed understanding.    - Discussed:     - Discussed with:  Patient      - Extended Intubation/Ventilatory Support Discussed: No.      - Patient is DNR/DNI Status: No    Use of blood products discussed: No .     Postoperative Care    Pain management: Oral pain medications, IV analgesics.   PONV prophylaxis: Ondansetron (or other 5HT-3), Dexamethasone or Solumedrol     Comments:    Other Comments: The risks and benefits of anesthesia, and the alternatives where applicable, have been discussed with the patient, and they wish to proceed.            SHIRA Felipe CRNA

## 2022-08-31 NOTE — ANESTHESIA POSTPROCEDURE EVALUATION
Patient: Carline Hart    Procedure: Procedure(s):  CHOLECYSTECTOMY, LAPAROSCOPIC       Anesthesia Type:  General    Note:  Disposition: Outpatient   Postop Pain Control: Uneventful            Sign Out: Well controlled pain   PONV: No   Neuro/Psych: Uneventful            Sign Out: Acceptable/Baseline neuro status   Airway/Respiratory: Uneventful            Sign Out: Acceptable/Baseline resp. status   CV/Hemodynamics: Uneventful            Sign Out: Acceptable CV status   Other NRE: NONE   DID A NON-ROUTINE EVENT OCCUR? No    Event details/Postop Comments:  Pt was happy with anesthesia care.  No complications.  I will follow up with the pt if needed.           Last vitals:  Vitals Value Taken Time   /76 08/31/22 0955   Temp 98.06  F (36.7  C) 08/31/22 0956   Pulse 100 08/31/22 0956   Resp 12 08/31/22 0956   SpO2 97 % 08/31/22 0956   Vitals shown include unvalidated device data.    Electronically Signed By: SHIRA Felipe CRNA  August 31, 2022  9:57 AM

## 2022-08-31 NOTE — DISCHARGE INSTRUCTIONS
[facility]   Adult Discharge Orders & Instructions     For 24 hours after surgery    Get plenty of rest.  A responsible adult must stay with you for at least 24 hours after you leave the hospital.   Do not drive or use heavy equipment.  If you have weakness or tingling, don't drive or use heavy equipment until this feeling goes away.  Do not drink alcohol.  Avoid strenuous or risky activities.  Ask for help when climbing stairs.   You may feel lightheaded.  If so, sit for a few minutes before standing.  Have someone help you get up.   You may have a slight fever. Call the doctor if your fever is over 100 F (37.7 C) (taken under the tongue) or lasts longer than 24 hours.  You may have a dry mouth, a sore throat, muscle aches or trouble sleeping.  These should go away after 24 hours.  Do not make important or legal decisions.  We don t expect you to have any problems from the surgery or treatment you had today. Just in case, here s what to do if you have pain, upset stomach (nausea), bleeding or infection:  Pain:  Take medicines your doctor has prescribed or over-the-counter medicine they have suggested. Resting and using ice packs can help, too. For surgery on an arm or leg, raise it on a pillow to ease swelling. Call your doctor if these methods don t work.  Copyright Emanuel Ward, Licensed under [employer]  Upset stomach (nausea):  Take anti-nausea medicine approved by your doctor. Drink clear liquids like apple juice, ginger ale, broth or 7-Up. Be sure to drink enough fluids. Rest can help, too. Move to normal foods when you re ready. Bleeding:  In the first 24 hours, you may see a little blood on your dressing, about the size of a quarter. You don t need to worry about this much blood, but if the blood spot keeps getting bigger:  Put pressure on the wound if you can, AND  Call your doctor.  Copyright [employer], Licensed under [employer]  Fever/Infection: Please call  your doctor if you have any of these signs:  Redness  Swelling  Wound feels warm  Pain gets worse  Bad-smelling fluid leaks from wound  Fever or chills  Call your doctor for any of the followin.  It has been over 8 to 10 hours since surgery and you are still not able to urinate (pass water).    2.  Headache for over 24 hours.    3.  Numbness, tingling or weakness in your legs the day after surgery (if you had spinal anesthesia).    24 Hour Nurse Advice Line: [phone]

## 2022-09-01 ENCOUNTER — TELEPHONE (OUTPATIENT)
Dept: SURGERY | Facility: CLINIC | Age: 27
End: 2022-09-01

## 2022-09-01 LAB
PATH REPORT.COMMENTS IMP SPEC: NORMAL
PATH REPORT.COMMENTS IMP SPEC: NORMAL
PATH REPORT.FINAL DX SPEC: NORMAL
PATH REPORT.GROSS SPEC: NORMAL
PATH REPORT.MICROSCOPIC SPEC OTHER STN: NORMAL
PATH REPORT.RELEVANT HX SPEC: NORMAL
PHOTO IMAGE: NORMAL

## 2022-09-01 PROCEDURE — 88304 TISSUE EXAM BY PATHOLOGIST: CPT | Mod: 26 | Performed by: PATHOLOGY

## 2022-09-01 NOTE — TELEPHONE ENCOUNTER
Caller is post op same day [facility]; is experiencing nausea and did  not get RX for Zofran ; called office before  closing and  request not addressed.  Triage protocol reviewed   Advised to contact on call surgeon and transferred to    Veronica AMBROSE          Reason for Disposition    [1] Caller has URGENT question AND [2] triager unable to answer question    Additional Information    Negative: Chest pain    Negative: Difficulty breathing    Negative: Acting confused (e.g., disoriented, slurred speech) or excessively sleepy    Negative: Post-Op tonsil and adenoid surgery, symptoms or questions about    Negative: Surgical incision symptoms and questions    Negative: [1] Pain or burning with passing urine (urination) AND [2] male    Negative: [1] Pain or burning with passing urine (urination) AND [2] female    Negative: Constipation    Negative: New or worsening leg (calf, thigh) pain    Negative: New or worsening leg swelling    Negative: Dizziness is severe, or persists > 24 hours after surgery    Negative: Pain, redness, swelling, or pus at IV Site    Negative: Symptoms arising from use of a urinary catheter (e.g., coude, Mario)    Negative: Cast problems or questions    Negative: Medication question    Negative: [1] Widespread rash AND [2] bright red, sunburn-like    Negative: [1] SEVERE headache AND [2] after spinal (epidural) anesthesia    Negative: [1] Vomiting AND [2] persists > 4 hours    Negative: [1] Vomiting AND [2] abdomen looks much more swollen than usual    Negative: [1] Drinking very little AND [2] dehydration suspected (e.g., no urine > 12 hours, very dry mouth, very lightheaded)    Negative: Patient sounds very sick or weak to the triager    Negative: Sounds like a serious complication to the triager    Negative: Fever > 100.4 F (38.0 C)    Protocols used: POST-OP SYMPTOMS AND [id number]

## 2022-09-01 NOTE — TELEPHONE ENCOUNTER
I called pt and informed her we can send the rx in but the on call provider did so she doesn't need them.  Mikala Choi MA

## 2022-09-01 NOTE — TELEPHONE ENCOUNTER
Our goal is to have forms completed with 72 hours, however some forms may require a visit or additional information.    Who is the form from?: Patient  Where the form came from: Patient or family brought in     What clinic location was the form placed at?: [place]  Where the form was placed: Given to MA/RN  What number is listed as a contact on the form?: [phone]    Phone call message- patient request for a letter, form or note:    Date needed: at your convenience  Please fax to [phone] and email to pt     Has the patient signed a consent form for release of information? NO    Additional comments:     Call taken on 9/1/2022 at 8:26 AM by Jeannie Choi MA    Type of letter, form or note: LA

## 2022-09-08 ENCOUNTER — OFFICE VISIT (OUTPATIENT)
Dept: SURGERY | Facility: CLINIC | Age: 27
End: 2022-09-08
Payer: COMMERCIAL

## 2022-09-08 VITALS
DIASTOLIC BLOOD PRESSURE: 82 MMHG | TEMPERATURE: 96.9 F | WEIGHT: 248 LBS | BODY MASS INDEX: 41.27 KG/M2 | SYSTOLIC BLOOD PRESSURE: 122 MMHG

## 2022-09-08 DIAGNOSIS — Z98.890 POST-OPERATIVE STATE: Primary | ICD-10-CM

## 2022-09-08 PROCEDURE — 99024 POSTOP FOLLOW-UP VISIT: CPT | Performed by: SPECIALIST

## 2022-09-08 ASSESSMENT — PAIN SCALES - GENERAL: PAINLEVEL: MILD PAIN (3)

## 2022-09-08 NOTE — PROGRESS NOTES
F/U for lap brian.    Subjective:  Pt feels good.  Pre-op sx have resolved.    Objective:  B/P: 122/82, T: 96.9, P: Data Unavailable, R: Data Unavailable  Abd: Soft, non tender, non distended,     Path: Chronic cholecystitis with cholelithiasis    Assessment/Plan:  Pt s/p lap brian. Doing well.  Pre-op sx have resolved.  Patient will increase their activity as fatty food intake as tolerated.  F/U with me PRN.    Renny Harris MD, FACS

## 2022-09-08 NOTE — LETTER
9/8/2022         RE: Carline Hart  [address] [facility] [address]        Dear Colleague,    Thank you for referring your patient, Carline Hart, to the [facility]. Please see a copy of my visit note below.    F/U for lap brian.    Subjective:  Pt feels good.  Pre-op sx have resolved.    Objective:  B/P: 122/82, T: 96.9, P: Data Unavailable, R: Data Unavailable  Abd: Soft, non tender, non distended,     Path: Chronic cholecystitis with cholelithiasis    Assessment/Plan:  Pt s/p lap brian. Doing well.  Pre-op sx have resolved.  Patient will increase their activity as fatty food intake as tolerated.  F/U with me PRN.    Renny Harris MD, FACS        Again, thank you for allowing me to participate in the care of your patient.        Sincerely,        Renny Harris MD

## 2022-09-25 ENCOUNTER — HEALTH MAINTENANCE LETTER (OUTPATIENT)
Age: 27
End: 2022-09-25

## 2022-11-01 ENCOUNTER — E-VISIT (OUTPATIENT)
Dept: FAMILY MEDICINE | Facility: CLINIC | Age: 27
End: 2022-11-01
Payer: COMMERCIAL

## 2022-11-01 DIAGNOSIS — B96.89 ACUTE BACTERIAL SINUSITIS: Primary | ICD-10-CM

## 2022-11-01 DIAGNOSIS — J01.90 ACUTE BACTERIAL SINUSITIS: Primary | ICD-10-CM

## 2022-11-01 PROCEDURE — 99421 OL DIG E/M SVC 5-10 MIN: CPT | Performed by: PHYSICIAN ASSISTANT

## 2022-11-01 NOTE — PATIENT INSTRUCTIONS
You may want to try a nasal lavage (also known as nasal irrigation). You can find over-the-counter products, such as Neti-Pot, at retail locations or make your own at home. Instructions for homemade nasal lavage and more information on the process are available online at http://www.aafp.org/afp/2009/1115/p1121.html.      Sinusitis (Antibiotic Treatment)    The sinuses are air-filled spaces within the bones of the face. They connect to the inside of the nose. Sinusitis is an inflammation of the tissue that lines the sinuses. Sinusitis can occur during a cold. It can also happen due to allergies to pollens and other particles in the air. Sinusitis can cause symptoms of sinus congestion and a feeling of fullness. A sinus infection causes fever, headache, and facial pain. There is often green or yellow fluid draining from the nose or into the back of the throat (post-nasal drip). You have been given antibiotics to treat this condition.   Home care  Take the full course of antibiotics as instructed. Don't stop taking them, even when you feel better.  Drink plenty of water, hot tea, and other liquids as directed by the healthcare provider. This may help thin nasal mucus. It also may help your sinuses drain fluids.  Heat may help soothe painful areas of your face. Use a towel soaked in hot water. Or,  the shower and direct the warm spray onto your face. Using a vaporizer along with a menthol rub at night may also help soothe symptoms.   An expectorant with guaifenesin may help thin nasal mucus and help your sinuses drain fluids. Talk with your provider or pharmacists before taking an over-the-counter (OTC) medicine if you have any questions about it or its side effects..  You can use an OTC decongestant, unless a similar medicine was prescribed to you. Nasal sprays work the fastest. Use one that contains phenylephrine or oxymetazoline. First blow your nose gently. Then use the spray. Don't use these medicines  more often than directed on the label. If you do, your symptoms may get worse. You may also take pills that contain pseudoephedrine. Don t use products that combine multiple medicines. This is because side effects may be increased. Read labels. You can also ask the pharmacist for help. (People with high blood pressure should not use decongestants. They can raise blood pressure.) Talk with your provider or pharmacist if you have any questions about the medicine..  OTC antihistamines may help if allergies contributed to your sinusitis. Talk with your provider or pharmacist if you have any questions about the medicine..  Don't use nasal rinses or irrigation during an acute sinus infection, unless your healthcare provider tells you to. Rinsing may spread the infection to other areas in your sinuses.  Use acetaminophen or ibuprofen to control pain, unless another pain medicine was prescribed to you. If you have chronic liver or kidney disease or ever had a stomach ulcer, talk with your healthcare provider before using these medicines. Never give aspirin to anyone under age 18 who is ill with a fever. It may cause severe liver damage.  Don't smoke. This can make symptoms worse.    Follow-up care  Follow up with your healthcare provider, or as advised.   When to seek medical advice  Call your healthcare provider if any of these occur:   Facial pain or headache that gets worse  Stiff neck  Unusual drowsiness or confusion  Swelling of your forehead or eyelids  Symptoms don't go away in 10 days  Vision problems, such as blurred or double vision  Fever of 100.4 F (38 C) or higher, or as directed by your healthcare provider  Call 911  Call 911 if any of these occur:   Seizure  Trouble breathing  Feeling dizzy or faint  Fingernails, skin or lips look blue, purple , or gray  Prevention  Here are steps you can take to help prevent an infection:   Keep good hand washing habits.  Don t have close contact with people who have sore  throats, colds, or other upper respiratory infections.  Don t smoke, and stay away from secondhand smoke.  Stay up to date with of your vaccines.  [employer] last reviewed this educational content on 12/1/2019 2000-2021 [employer]. All rights reserved. This information is not intended as a substitute for professional medical care. Always follow your healthcare professional's instructions.        Dear Carline Hart    After reviewing your responses, I've been able to diagnose you with?a sinus infection caused by bacteria.?     Based on your responses and diagnosis, I have prescribed antibiotic to treat your symptoms. I have sent this to your pharmacy.?     It is also important to stay well hydrated, get lots of rest and take over-the-counter decongestants,?tylenol?or ibuprofen if you?are able to?take those medications per your primary care provider to help relieve discomfort.?     It is important that you take?all of?your prescribed medication even if your symptoms are improving after a few doses.? Taking?all of?your medicine helps prevent the symptoms from returning.?     If your symptoms worsen, you develop severe headache, vomiting, high fever (>102), or are not improving in 7 days, please contact your primary care provider for an appointment or visit any of our convenient Walk-in Care or Urgent Care Centers to be seen which can be found on our website?here.?     Thanks again for choosing?us?as your health care partner,?   ?  Ana Perez PA-C?

## 2023-02-15 ASSESSMENT — ENCOUNTER SYMPTOMS
ABDOMINAL PAIN: 0
HEARTBURN: 0
DIARRHEA: 0
CHILLS: 0
HEMATOCHEZIA: 0
PARESTHESIAS: 0
DIZZINESS: 1
FEVER: 0
JOINT SWELLING: 1
HEMATURIA: 0
HEADACHES: 0
MYALGIAS: 0
NAUSEA: 0
BREAST MASS: 0
SHORTNESS OF BREATH: 0
DYSURIA: 0
FREQUENCY: 0
NERVOUS/ANXIOUS: 0
COUGH: 0
SORE THROAT: 0
CONSTIPATION: 0
PALPITATIONS: 0
EYE PAIN: 0
WEAKNESS: 0

## 2023-02-20 PROBLEM — E55.9 VITAMIN D DEFICIENCY: Status: RESOLVED | Noted: 2019-06-25 | Resolved: 2023-02-20

## 2023-02-21 ENCOUNTER — OFFICE VISIT (OUTPATIENT)
Dept: FAMILY MEDICINE | Facility: CLINIC | Age: 28
End: 2023-02-21
Payer: COMMERCIAL

## 2023-02-21 VITALS
SYSTOLIC BLOOD PRESSURE: 102 MMHG | BODY MASS INDEX: 40.66 KG/M2 | DIASTOLIC BLOOD PRESSURE: 60 MMHG | WEIGHT: 253 LBS | TEMPERATURE: 97.3 F | OXYGEN SATURATION: 100 % | HEIGHT: 66 IN | HEART RATE: 80 BPM

## 2023-02-21 DIAGNOSIS — R53.83 FATIGUE, UNSPECIFIED TYPE: ICD-10-CM

## 2023-02-21 DIAGNOSIS — Z00.00 ROUTINE GENERAL MEDICAL EXAMINATION AT A HEALTH CARE FACILITY: Primary | ICD-10-CM

## 2023-02-21 DIAGNOSIS — Z13.220 SCREENING FOR LIPID DISORDERS: ICD-10-CM

## 2023-02-21 DIAGNOSIS — E66.01 MORBID OBESITY (H): ICD-10-CM

## 2023-02-21 DIAGNOSIS — Z13.1 SCREENING FOR DIABETES MELLITUS: ICD-10-CM

## 2023-02-21 DIAGNOSIS — N92.0 MENSTRUAL SPOTTING: ICD-10-CM

## 2023-02-21 LAB
ANION GAP SERPL CALCULATED.3IONS-SCNC: 11 MMOL/L (ref 7–15)
BUN SERPL-MCNC: 10.7 MG/DL (ref 6–20)
CALCIUM SERPL-MCNC: 9.1 MG/DL (ref 8.6–10)
CHLORIDE SERPL-SCNC: 103 MMOL/L (ref 98–107)
CHOLEST SERPL-MCNC: 169 MG/DL
CLUE CELLS: ABNORMAL
CREAT SERPL-MCNC: 0.62 MG/DL (ref 0.51–0.95)
DEPRECATED HCO3 PLAS-SCNC: 24 MMOL/L (ref 22–29)
ERYTHROCYTE [DISTWIDTH] IN BLOOD BY AUTOMATED COUNT: 12.6 % (ref 10–15)
GFR SERPL CREATININE-BSD FRML MDRD: >90 ML/MIN/1.73M2
GLUCOSE SERPL-MCNC: 83 MG/DL (ref 70–99)
HCT VFR BLD AUTO: 36.7 % (ref 35–47)
HDLC SERPL-MCNC: 43 MG/DL
HGB BLD-MCNC: 12.5 G/DL (ref 11.7–15.7)
LDLC SERPL CALC-MCNC: 100 MG/DL
MCH RBC QN AUTO: 29.5 PG (ref 26.5–33)
MCHC RBC AUTO-ENTMCNC: 34.1 G/DL (ref 31.5–36.5)
MCV RBC AUTO: 87 FL (ref 78–100)
NONHDLC SERPL-MCNC: 126 MG/DL
PLATELET # BLD AUTO: 310 10E3/UL (ref 150–450)
POTASSIUM SERPL-SCNC: 4.1 MMOL/L (ref 3.4–5.3)
RBC # BLD AUTO: 4.24 10E6/UL (ref 3.8–5.2)
SODIUM SERPL-SCNC: 138 MMOL/L (ref 136–145)
TRICHOMONAS, WET PREP: ABNORMAL
TRIGL SERPL-MCNC: 131 MG/DL
TSH SERPL DL<=0.005 MIU/L-ACNC: 1.2 UIU/ML (ref 0.3–4.2)
WBC # BLD AUTO: 7.7 10E3/UL (ref 4–11)
WBC'S/HIGH POWER FIELD, WET PREP: ABNORMAL
YEAST, WET PREP: ABNORMAL

## 2023-02-21 PROCEDURE — 99213 OFFICE O/P EST LOW 20 MIN: CPT | Mod: 25 | Performed by: NURSE PRACTITIONER

## 2023-02-21 PROCEDURE — 84443 ASSAY THYROID STIM HORMONE: CPT | Performed by: NURSE PRACTITIONER

## 2023-02-21 PROCEDURE — 82306 VITAMIN D 25 HYDROXY: CPT | Performed by: NURSE PRACTITIONER

## 2023-02-21 PROCEDURE — 87491 CHLMYD TRACH DNA AMP PROBE: CPT | Performed by: NURSE PRACTITIONER

## 2023-02-21 PROCEDURE — 80061 LIPID PANEL: CPT | Performed by: NURSE PRACTITIONER

## 2023-02-21 PROCEDURE — 99395 PREV VISIT EST AGE 18-39: CPT | Performed by: NURSE PRACTITIONER

## 2023-02-21 PROCEDURE — 87210 SMEAR WET MOUNT SALINE/INK: CPT | Performed by: NURSE PRACTITIONER

## 2023-02-21 PROCEDURE — 36415 COLL VENOUS BLD VENIPUNCTURE: CPT | Performed by: NURSE PRACTITIONER

## 2023-02-21 PROCEDURE — 80048 BASIC METABOLIC PNL TOTAL CA: CPT | Performed by: NURSE PRACTITIONER

## 2023-02-21 PROCEDURE — 85027 COMPLETE CBC AUTOMATED: CPT | Performed by: NURSE PRACTITIONER

## 2023-02-21 PROCEDURE — 87591 N.GONORRHOEAE DNA AMP PROB: CPT | Performed by: NURSE PRACTITIONER

## 2023-02-21 ASSESSMENT — PATIENT HEALTH QUESTIONNAIRE - PHQ9
SUM OF ALL RESPONSES TO PHQ QUESTIONS 1-9: 2
10. IF YOU CHECKED OFF ANY PROBLEMS, HOW DIFFICULT HAVE THESE PROBLEMS MADE IT FOR YOU TO DO YOUR WORK, TAKE CARE OF THINGS AT HOME, OR GET ALONG WITH OTHER PEOPLE: NOT DIFFICULT AT ALL
SUM OF ALL RESPONSES TO PHQ QUESTIONS 1-9: 2

## 2023-02-21 NOTE — PROGRESS NOTES
SUBJECTIVE:   CC: Carline is an 27 year old who presents for preventive health visit.     Patient lives with her boyfriend and her [id number] son Barry.  Working as a  at [employer].    Her recent pregnancy went well.  She did need to be induced early due to preeclampsia.  No gestational diabetes.    Patient reports some spotting between her menstrual periods the last 2 months.  She will have very light spotting for about 3 days.  Her regular cycle has been more regular than it has been in the past.  She has a history of PCOS.  Previously on OCPs, but she really does not want to start anything.  Currently abstinent.    Patient is feeling tired a lot.  She does need to use a sleep aid such as melatonin or Benadryl at night to sleep.  Her son is sleeping fairly well, but she does have to travel an hour each way for work.  She works many hours per week.    Patient is getting 2-3 mild headaches every week.  Denies any nausea/vomiting or sensitivity to light/sound.  She does have a history of migraine headaches, but the symptoms are not consistent with those.  She will take Excedrin which is helpful if she takes it early in the headache.  She reports she is very bad at hydration.  Discussed significantly increasing her water intake.  If her headache symptoms do not improve, could discuss other treatments.    Patient has been advised of split billing requirements and indicates understanding: Yes  Healthy Habits:     Getting at least 3 servings of Calcium per day:  NO    Bi-annual eye exam:  Yes    Dental care twice a year:  NO    Sleep apnea or symptoms of sleep apnea:  None    Diet:  Regular (no restrictions)    Frequency of exercise:  None    Taking medications regularly:  Not Applicable    Medication side effects:  Not applicable    PHQ-2 Total Score: 0    Additional concerns today:  Yes  Answers for HPI/ROS submitted by the patient on 2/21/2023  If you checked off any problems, how difficult have  "these problems made it for you to do your work, take care of things at home, or get along with other people?: Not difficult at all  PHQ9 TOTAL SCORE: 2      Today's PHQ-2 Score:   PHQ-2 ( 1999 [employer]) 2/15/2023   Q1: Little interest or pleasure in doing things 0   Q2: Feeling down, depressed or hopeless 0   PHQ-2 Score 0   Q1: Little interest or pleasure in doing things Not at all   Q2: Feeling down, depressed or hopeless Not at all   PHQ-2 Score 0       Social History     Tobacco Use     Smoking status: Never     Smokeless tobacco: Never   Substance Use Topics     Alcohol use: Yes     Comment: Rare use.     If you drink alcohol do you typically have >3 drinks per day or >7 drinks per week? No    No flowsheet data found.    Reviewed orders with patient.  Reviewed health maintenance and updated orders accordingly - Yes      Breast Cancer Screening:    Breast CA Risk Assessment (FHS-7) 8/9/2022   Do you have a family history of breast, colon, or ovarian cancer? No / Unknown       Pertinent mammograms are reviewed under the imaging tab.    History of abnormal Pap smear: NO - age 30-65 PAP every 5 years with negative HPV co-testing recommended  PAP / HPV Latest Ref Rng & Units 2/2/2022 6/24/2019   PAP   Negative for Intraepithelial Lesion or Malignancy (NILM) Negative for squamous intraepithelial lesion or malignancy  Electronically signed by Aditi Bean CT (ASCP) on 6/26/2019 at 11:07 AM       Reviewed and updated as needed this visit by clinical staff   Tobacco  Allergies  Meds  Problems  Med Hx  Surg Hx  Fam Hx          Reviewed and updated as needed this visit by Provider   Tobacco  Allergies  Meds  Problems  Med Hx  Surg Hx  Fam Hx             Review of Systems  Pertinent items in HPI       OBJECTIVE:   /60 (BP Location: Left arm, Patient Position: Sitting, Cuff Size: Adult Large)   Pulse 80   Temp 97.3  F (36.3  C) (Temporal)   Ht 1.664 m (5' 5.5\")   Wt 114.8 kg (253 lb)   LMP " 01/09/2023   SpO2 100%   BMI 41.46 kg/m    Physical Exam  GENERAL: healthy, alert and no distress  EYES: Eyes grossly normal to inspection, PERRL and conjunctivae and sclerae normal  HENT: ear canals and TM's normal, nose and mouth without ulcers or lesions  NECK: no adenopathy, no asymmetry, masses, or scars and thyroid normal to palpation  RESP: lungs clear to auscultation - no rales, rhonchi or wheezes  BREAST: normal without masses, tenderness or nipple discharge and no palpable axillary masses or adenopathy  CV: regular rate and rhythm, normal S1 S2, no S3 or S4, no murmur, click or rub, no peripheral edema  ABDOMEN: soft, nontender, no hepatosplenomegaly, no masses and bowel sounds normal   (female): normal female external genitalia, normal urethral meatus, vaginal mucosa pink, moist, well rugated, and normal cervix/adnexa/uterus without masses or discharge  MS: no gross musculoskeletal defects noted, no edema  SKIN: no suspicious lesions or rashes  NEURO: Normal strength and tone, mentation intact and speech normal  PSYCH: mentation appears normal, affect normal/bright        ASSESSMENT/PLAN:   Carline was seen today for physical.    Diagnoses and all orders for this visit:    Routine general medical examination at a health care facility    Morbid obesity (H)  Discussed diet and lifestyle modifications.     Fatigue, unspecified type  -     CBC with platelets  -     TSH with free T4 reflex  -     Vitamin D Deficiency    Screening for diabetes mellitus  -     Basic metabolic panel    Screening for lipid disorders  -     Lipid panel reflex to direct LDL Non-fasting    Menstrual spotting  Could be associated with her PCOS.  STD testing pending.  Wet prep negative.  Discussed restarting OCPs to regulate bleeding, but patient is not interested at this time.  Reviewed symptoms that would warrant follow up including heavy bleeding or significant pelvic pain.    -     NEISSERIA GONORRHOEA PCR  -     CHLAMYDIA  "TRACHOMATIS PCR  -     Wet prep - Clinic Collect        Patient has been advised of split billing requirements and indicates understanding: Yes      COUNSELING:  Reviewed preventive health counseling, as reflected in patient instructions       Regular exercise       Healthy diet/nutrition      BMI:   Estimated body mass index is 41.46 kg/m  as calculated from the following:    Height as of this encounter: 1.664 m (5' 5.5\").    Weight as of this encounter: 114.8 kg (253 lb).   Weight management plan: Discussed healthy diet and exercise guidelines      She reports that she has never smoked. She has never used smokeless tobacco.        Lynn Shell NP  [facility]  "

## 2023-02-22 LAB
C TRACH DNA SPEC QL NAA+PROBE: NEGATIVE
DEPRECATED CALCIDIOL+CALCIFEROL SERPL-MC: 12 UG/L (ref 20–75)
N GONORRHOEA DNA SPEC QL NAA+PROBE: NEGATIVE

## 2024-05-11 ENCOUNTER — HEALTH MAINTENANCE LETTER (OUTPATIENT)
Age: 29
End: 2024-05-11

## 2024-09-15 ENCOUNTER — APPOINTMENT (OUTPATIENT)
Dept: CT IMAGING | Facility: CLINIC | Age: 29
End: 2024-09-15
Attending: EMERGENCY MEDICINE
Payer: COMMERCIAL

## 2024-09-15 ENCOUNTER — APPOINTMENT (OUTPATIENT)
Dept: CT IMAGING | Facility: CLINIC | Age: 29
End: 2024-09-15
Payer: COMMERCIAL

## 2024-09-15 ENCOUNTER — HOSPITAL ENCOUNTER (EMERGENCY)
Facility: CLINIC | Age: 29
Discharge: HOME | End: 2024-09-16
Payer: COMMERCIAL

## 2024-09-15 VITALS
HEART RATE: 95 BPM | OXYGEN SATURATION: 99 % | DIASTOLIC BLOOD PRESSURE: 81 MMHG | TEMPERATURE: 98.8 F | HEIGHT: 66 IN | SYSTOLIC BLOOD PRESSURE: 165 MMHG | BODY MASS INDEX: 40.66 KG/M2 | WEIGHT: 253 LBS | RESPIRATION RATE: 19 BRPM

## 2024-09-15 DIAGNOSIS — R10.9 ABDOMINAL PAIN, UNSPECIFIED ABDOMINAL LOCATION: ICD-10-CM

## 2024-09-15 DIAGNOSIS — R19.7 NAUSEA VOMITING AND DIARRHEA: ICD-10-CM

## 2024-09-15 DIAGNOSIS — R11.2 NAUSEA VOMITING AND DIARRHEA: ICD-10-CM

## 2024-09-15 DIAGNOSIS — R79.89 ELEVATED LACTIC ACID LEVEL: ICD-10-CM

## 2024-09-15 LAB
ALBUMIN SERPL BCG-MCNC: 4.3 G/DL (ref 3.5–5.2)
ALBUMIN UR-MCNC: NEGATIVE MG/DL
ALP SERPL-CCNC: 103 U/L (ref 40–150)
ALT SERPL W P-5'-P-CCNC: 19 U/L (ref 0–50)
ANION GAP SERPL CALCULATED.3IONS-SCNC: 12 MMOL/L (ref 7–15)
APPEARANCE UR: CLEAR
AST SERPL W P-5'-P-CCNC: 22 U/L (ref 0–45)
BASOPHILS # BLD AUTO: 0 10E3/UL (ref 0–0.2)
BASOPHILS NFR BLD AUTO: 0 %
BILIRUB SERPL-MCNC: 0.6 MG/DL
BILIRUB UR QL STRIP: NEGATIVE
BUN SERPL-MCNC: 9.8 MG/DL (ref 6–20)
CALCIUM SERPL-MCNC: 8.6 MG/DL (ref 8.8–10.4)
CHLORIDE SERPL-SCNC: 104 MMOL/L (ref 98–107)
COLOR UR AUTO: ABNORMAL
CREAT SERPL-MCNC: 0.69 MG/DL (ref 0.51–0.95)
EGFRCR SERPLBLD CKD-EPI 2021: >90 ML/MIN/1.73M2
EOSINOPHIL # BLD AUTO: 0 10E3/UL (ref 0–0.7)
EOSINOPHIL NFR BLD AUTO: 0 %
ERYTHROCYTE [DISTWIDTH] IN BLOOD BY AUTOMATED COUNT: 12.3 % (ref 10–15)
FLUAV RNA SPEC QL NAA+PROBE: NEGATIVE
FLUBV RNA RESP QL NAA+PROBE: NEGATIVE
GLUCOSE SERPL-MCNC: 118 MG/DL (ref 70–99)
GLUCOSE UR STRIP-MCNC: NEGATIVE MG/DL
HCG SERPL QL: NEGATIVE
HCO3 SERPL-SCNC: 25 MMOL/L (ref 22–29)
HCT VFR BLD AUTO: 40.8 % (ref 35–47)
HGB BLD-MCNC: 14.2 G/DL (ref 11.7–15.7)
HGB UR QL STRIP: ABNORMAL
IMM GRANULOCYTES # BLD: 0.1 10E3/UL
IMM GRANULOCYTES NFR BLD: 0 %
KETONES UR STRIP-MCNC: NEGATIVE MG/DL
LACTATE SERPL-SCNC: 2 MMOL/L (ref 0.7–2)
LACTATE SERPL-SCNC: 2.3 MMOL/L (ref 0.7–2)
LACTATE SERPL-SCNC: 2.4 MMOL/L (ref 0.7–2)
LACTATE SERPL-SCNC: 2.4 MMOL/L (ref 0.7–2)
LEUKOCYTE ESTERASE UR QL STRIP: NEGATIVE
LIPASE SERPL-CCNC: 21 U/L (ref 13–60)
LYMPHOCYTES # BLD AUTO: 0.3 10E3/UL (ref 0.8–5.3)
LYMPHOCYTES NFR BLD AUTO: 2 %
MCH RBC QN AUTO: 29.9 PG (ref 26.5–33)
MCHC RBC AUTO-ENTMCNC: 34.8 G/DL (ref 31.5–36.5)
MCV RBC AUTO: 86 FL (ref 78–100)
MONOCYTES # BLD AUTO: 0.3 10E3/UL (ref 0–1.3)
MONOCYTES NFR BLD AUTO: 2 %
MUCOUS THREADS #/AREA URNS LPF: PRESENT /LPF
NEUTROPHILS # BLD AUTO: 13.2 10E3/UL (ref 1.6–8.3)
NEUTROPHILS NFR BLD AUTO: 95 %
NITRATE UR QL: NEGATIVE
NRBC # BLD AUTO: 0 10E3/UL
NRBC BLD AUTO-RTO: 0 /100
PH UR STRIP: 7.5 [PH] (ref 5–7)
PLATELET # BLD AUTO: 278 10E3/UL (ref 150–450)
POTASSIUM SERPL-SCNC: 4.1 MMOL/L (ref 3.4–5.3)
PROT SERPL-MCNC: 7.3 G/DL (ref 6.4–8.3)
RBC # BLD AUTO: 4.75 10E6/UL (ref 3.8–5.2)
RBC URINE: 7 /HPF
RSV RNA SPEC NAA+PROBE: NEGATIVE
SARS-COV-2 RNA RESP QL NAA+PROBE: NEGATIVE
SODIUM SERPL-SCNC: 141 MMOL/L (ref 135–145)
SP GR UR STRIP: 1.02 (ref 1–1.03)
SQUAMOUS EPITHELIAL: 1 /HPF
UROBILINOGEN UR STRIP-MCNC: <2 MG/DL
WBC # BLD AUTO: 13.9 10E3/UL (ref 4–11)
WBC URINE: <1 /HPF

## 2024-09-15 PROCEDURE — 71275 CT ANGIOGRAPHY CHEST: CPT

## 2024-09-15 PROCEDURE — 81001 URINALYSIS AUTO W/SCOPE: CPT

## 2024-09-15 PROCEDURE — 87637 SARSCOV2&INF A&B&RSV AMP PRB: CPT | Performed by: EMERGENCY MEDICINE

## 2024-09-15 PROCEDURE — 258N000003 HC RX IP 258 OP 636: Performed by: EMERGENCY MEDICINE

## 2024-09-15 PROCEDURE — 96374 THER/PROPH/DIAG INJ IV PUSH: CPT | Mod: 59

## 2024-09-15 PROCEDURE — 83605 ASSAY OF LACTIC ACID: CPT

## 2024-09-15 PROCEDURE — 258N000003 HC RX IP 258 OP 636

## 2024-09-15 PROCEDURE — 99285 EMERGENCY DEPT VISIT HI MDM: CPT | Mod: 25

## 2024-09-15 PROCEDURE — 250N000013 HC RX MED GY IP 250 OP 250 PS 637

## 2024-09-15 PROCEDURE — 96361 HYDRATE IV INFUSION ADD-ON: CPT

## 2024-09-15 PROCEDURE — 85025 COMPLETE CBC W/AUTO DIFF WBC: CPT

## 2024-09-15 PROCEDURE — 250N000011 HC RX IP 250 OP 636

## 2024-09-15 PROCEDURE — 36415 COLL VENOUS BLD VENIPUNCTURE: CPT | Performed by: EMERGENCY MEDICINE

## 2024-09-15 PROCEDURE — 36415 COLL VENOUS BLD VENIPUNCTURE: CPT

## 2024-09-15 PROCEDURE — 80053 COMPREHEN METABOLIC PANEL: CPT

## 2024-09-15 PROCEDURE — 74177 CT ABD & PELVIS W/CONTRAST: CPT

## 2024-09-15 PROCEDURE — 84703 CHORIONIC GONADOTROPIN ASSAY: CPT

## 2024-09-15 PROCEDURE — 250N000011 HC RX IP 250 OP 636: Performed by: EMERGENCY MEDICINE

## 2024-09-15 PROCEDURE — 96375 TX/PRO/DX INJ NEW DRUG ADDON: CPT | Mod: 59

## 2024-09-15 PROCEDURE — 83690 ASSAY OF LIPASE: CPT

## 2024-09-15 PROCEDURE — 83605 ASSAY OF LACTIC ACID: CPT | Mod: 91 | Performed by: EMERGENCY MEDICINE

## 2024-09-15 PROCEDURE — 250N000013 HC RX MED GY IP 250 OP 250 PS 637: Performed by: EMERGENCY MEDICINE

## 2024-09-15 RX ORDER — ACETAMINOPHEN 325 MG/1
975 TABLET ORAL ONCE
Status: COMPLETED | OUTPATIENT
Start: 2024-09-15 | End: 2024-09-15

## 2024-09-15 RX ORDER — ONDANSETRON 2 MG/ML
4 INJECTION INTRAMUSCULAR; INTRAVENOUS ONCE
Status: COMPLETED | OUTPATIENT
Start: 2024-09-15 | End: 2024-09-15

## 2024-09-15 RX ORDER — IBUPROFEN 600 MG/1
600 TABLET, FILM COATED ORAL ONCE
Status: COMPLETED | OUTPATIENT
Start: 2024-09-15 | End: 2024-09-15

## 2024-09-15 RX ORDER — IOPAMIDOL 755 MG/ML
90 INJECTION, SOLUTION INTRAVASCULAR ONCE
Status: COMPLETED | OUTPATIENT
Start: 2024-09-15 | End: 2024-09-15

## 2024-09-15 RX ORDER — IOPAMIDOL 755 MG/ML
75 INJECTION, SOLUTION INTRAVASCULAR ONCE
Status: COMPLETED | OUTPATIENT
Start: 2024-09-15 | End: 2024-09-15

## 2024-09-15 RX ADMIN — ACETAMINOPHEN 975 MG: 325 TABLET ORAL at 13:32

## 2024-09-15 RX ADMIN — SODIUM CHLORIDE, POTASSIUM CHLORIDE, SODIUM LACTATE AND CALCIUM CHLORIDE 1000 ML: 600; 310; 30; 20 INJECTION, SOLUTION INTRAVENOUS at 13:25

## 2024-09-15 RX ADMIN — IOPAMIDOL 75 ML: 755 INJECTION, SOLUTION INTRAVENOUS at 18:28

## 2024-09-15 RX ADMIN — SODIUM CHLORIDE, POTASSIUM CHLORIDE, SODIUM LACTATE AND CALCIUM CHLORIDE 1000 ML: 600; 310; 30; 20 INJECTION, SOLUTION INTRAVENOUS at 18:09

## 2024-09-15 RX ADMIN — FAMOTIDINE 20 MG: 10 INJECTION, SOLUTION INTRAVENOUS at 13:25

## 2024-09-15 RX ADMIN — IOPAMIDOL 90 ML: 755 INJECTION, SOLUTION INTRAVENOUS at 15:10

## 2024-09-15 RX ADMIN — ONDANSETRON 4 MG: 2 INJECTION INTRAMUSCULAR; INTRAVENOUS at 13:24

## 2024-09-15 RX ADMIN — IBUPROFEN 600 MG: 600 TABLET ORAL at 20:33

## 2024-09-15 RX ADMIN — SODIUM CHLORIDE, POTASSIUM CHLORIDE, SODIUM LACTATE AND CALCIUM CHLORIDE 1000 ML: 600; 310; 30; 20 INJECTION, SOLUTION INTRAVENOUS at 14:55

## 2024-09-15 ASSESSMENT — ACTIVITIES OF DAILY LIVING (ADL)
ADLS_ACUITY_SCORE: 35

## 2024-09-15 ASSESSMENT — COLUMBIA-SUICIDE SEVERITY RATING SCALE - C-SSRS
1. IN THE PAST MONTH, HAVE YOU WISHED YOU WERE DEAD OR WISHED YOU COULD GO TO SLEEP AND NOT WAKE UP?: NO
2. HAVE YOU ACTUALLY HAD ANY THOUGHTS OF KILLING YOURSELF IN THE PAST MONTH?: NO
6. HAVE YOU EVER DONE ANYTHING, STARTED TO DO ANYTHING, OR PREPARED TO DO ANYTHING TO END YOUR LIFE?: NO

## 2024-09-15 NOTE — ED TRIAGE NOTES
Pt presents to the ED with c/o N/V/D, abdominal pain, and HA that started at 3am this morning, states was fine yesterday. Had a virtual visit this AM, prescribed zofran. Has been getting worse, so came in. Hx gallbladder removal 2 years ago. Reports 4 episodes of vomiting and diarrhea. Highest temp at home 101.5F. Has not been able to keep meds down.      Triage Assessment (Adult)       Row Name 09/15/24 5867          Triage Assessment    Airway WDL WDL        Respiratory WDL    Respiratory WDL WDL        Skin Circulation/Temperature WDL    Skin Circulation/Temperature WDL WDL        Cardiac WDL    Cardiac WDL X;rhythm     Pulse Rate & Regularity tachycardic        Peripheral/Neurovascular WDL    Peripheral Neurovascular WDL WDL        Cognitive/Neuro/Behavioral WDL    Cognitive/Neuro/Behavioral WDL WDL

## 2024-09-15 NOTE — ED NOTES
EMERGENCY DEPARTMENT SIGN OUT NOTE        ED COURSE AND MEDICAL DECISION MAKING  Patient was signed out to me by Dr Wang  at 3:30 PM    In brief, Carline Hart is a 29 year old female who initially presented with nausea, vomiting, diarrhea, epigastric pain, fever. Patient with a remote history of cholecystectomy. Initially tachycardic, but improved with IV fluids. Plan for repeat lactic acid after 2 L IVF and CT abdomen/pelvis results. If unremarkable, patient will be discharged home with likely gastroenteritis.      At time of sign out, disposition was pending repeat lactic acid and CT abdomen/pelvis    4:25 PM I reassessed the patient.  She is well-appearing, nontoxic.  I updated her on CT scan abdomen pelvis.  Repeat lactic acid was 2.4.  On my evaluation of the patient, she still had over a liter of her initial 2 L bolus.  Will therefore repeat another lactic after she gets the full 2 L bolus and reassess.  5:51 PM I reassessed the patient.  Her heart rate is still around 109.  She reports that her normal heart rate is 60-80.  Patient with continued tachycardia with no clear etiology was concerning for possible PE.  Patient denies OCPs, tobacco use, but she does report a recent 6-hour car ride from [place] 3 weeks ago.  On my exam, no calf tenderness.  She is amenable for CT chest to rule out PE, to look for other infectious ideology in her lungs.  Will also plan for third liter of fluid and repeat lactic acid after third liter.  7:30 PM I updated the patient on CTA chest findings.  Awaiting IV fluids to finish and repeat lactic acid.  8:08 PM repeat lactic acid is normal.  Patient is feeling well.  Heart rate is in the 90s.  Patient is comfortable with discharge home.  I offered antiemetics and she states she has a prescription at home that she will fill tomorrow.    FINAL IMPRESSION    1. Abdominal pain, unspecified abdominal location    2. Nausea vomiting and diarrhea    3. Elevated lactic acid level         ED MEDS  Medications   lactated ringers BOLUS 1,000 mL (has no administration in time range)   lactated ringers BOLUS 1,000 mL (0 mLs Intravenous Stopped 9/15/24 1605)   acetaminophen (TYLENOL) tablet 975 mg (975 mg Oral $Given 9/15/24 1332)   ondansetron (ZOFRAN) injection 4 mg (4 mg Intravenous $Given 9/15/24 1324)   famotidine (PEPCID) injection 20 mg (20 mg Intravenous $Given 9/15/24 1325)   lactated ringers BOLUS 1,000 mL (1,000 mLs Intravenous $New Bag 9/15/24 1455)   iopamidol (ISOVUE-370) solution 90 mL (90 mLs Intravenous $Given 9/15/24 1510)       LAB  Labs Ordered and Resulted from Time of ED Arrival to Time of ED Departure   COMPREHENSIVE METABOLIC PANEL - Abnormal       Result Value    Sodium 141      Potassium 4.1      Carbon Dioxide (CO2) 25      Anion Gap 12      Urea Nitrogen 9.8      Creatinine 0.69      GFR Estimate >90      Calcium 8.6 (*)     Chloride 104      Glucose 118 (*)     Alkaline Phosphatase 103      AST 22      ALT 19      Protein Total 7.3      Albumin 4.3      Bilirubin Total 0.6     ROUTINE UA WITH MICROSCOPIC REFLEX TO CULTURE - Abnormal    Color Urine Light Yellow      Appearance Urine Clear      Glucose Urine Negative      Bilirubin Urine Negative      Ketones Urine Negative      Specific Gravity Urine 1.021      Blood Urine 0.06 mg/dL (*)     pH Urine 7.5 (*)     Protein Albumin Urine Negative      Urobilinogen Urine <2.0      Nitrite Urine Negative      Leukocyte Esterase Urine Negative      Mucus Urine Present (*)     RBC Urine 7 (*)     WBC Urine <1      Squamous Epithelials Urine 1     LACTIC ACID WHOLE BLOOD WITH 1X REPEAT IN 2 HR WHEN >2 - Abnormal    Lactic Acid, Initial 2.4 (*)    CBC WITH PLATELETS AND DIFFERENTIAL - Abnormal    WBC Count 13.9 (*)     RBC Count 4.75      Hemoglobin 14.2      Hematocrit 40.8      MCV 86      MCH 29.9      MCHC 34.8      RDW 12.3      Platelet Count 278      % Neutrophils 95      % Lymphocytes 2      % Monocytes 2      % Eosinophils  0      % Basophils 0      % Immature Granulocytes 0      NRBCs per 100 WBC 0      Absolute Neutrophils 13.2 (*)     Absolute Lymphocytes 0.3 (*)     Absolute Monocytes 0.3      Absolute Eosinophils 0.0      Absolute Basophils 0.0      Absolute Immature Granulocytes 0.1      Absolute NRBCs 0.0     LACTIC ACID WHOLE BLOOD - Abnormal    Lactic Acid 2.4 (*)    INFLUENZA A/B, RSV, & SARS-COV2 PCR - Normal    Influenza A PCR Negative      Influenza B PCR Negative      RSV PCR Negative      SARS CoV2 PCR Negative     LIPASE - Normal    Lipase 21     HCG QUALITATIVE PREGNANCY - Normal    hCG Serum Qualitative Negative     LACTIC ACID WHOLE BLOOD         RADIOLOGY    CT Abdomen Pelvis w Contrast   Final Result   IMPRESSION:    No acute findings or other explanation for symptoms.             DISCHARGE MEDS  New Prescriptions    No medications on file       Jenni Pacheco MD  [facility] EMERGENCY ROOM  [address] [place] [id number] [phone]         Jenni Pacheco MD  09/15/24 2009

## 2024-09-15 NOTE — ED PROVIDER NOTES
EMERGENCY DEPARTMENT ENCOUNTER      NAME: Carline Hart  AGE: 29 year old female  YOB: 1995  MRN: [id number]  EVALUATION DATE & TIME: 9/15/2024 12:33 PM    PCP: Lynn Shell    ED PROVIDER: Xavier Wang MD      Chief Complaint   Patient presents with    Abdominal Pain    Nausea, Vomiting, & Diarrhea         FINAL IMPRESSION:  1. Abdominal pain, unspecified abdominal location    2. Nausea vomiting and diarrhea    3. Elevated lactic acid level          ED COURSE & MEDICAL DECISION MAKING:    Pertinent Labs & Imaging studies reviewed. (See chart for details)  29 year old female presents to the Emergency Department for evaluation of abdominal pain, nausea, vomiting, diarrhea.  Differential diagnosis considered sepsis, UTI, pneumonia, intra-abdominal abscess, gallbladder pathology, bowel obstruction.     ED Course as of 09/15/24 1526   Sun Sep 15, 2024   1242 29-year-old female with a history of cholecystectomy presenting with nausea vomiting and diarrhea that started at 3 AM this morning.  She was seen by our virtual visit this morning prescribe Zofran.  She has been able to keep her meds down and presents for further evaluation.  On arrival temperature is 100 Fahrenheit she is tachycardic to 130s but her blood pressure is normal.  Concern for intra-abdominal process or viral syndrome based on history and exam and will obtain labs, UA, pregnancy and COVID test.  Will also symptomatically treated with Zofran, IV fluids and Tylenol.  Also obtain a CT scan.  Will also treat with Pepcid given her current vomiting.  Do not believe she has had active hematemesis, variceal bleed.   1305 Patient deferred pain medications.   1417 Lactic acid whole blood with 1x repeat in 2 hr when >2(!)  Elevated lactate.  Will order 2-hour repeat.  Will give additional fluids.  Could be due to dehydration.   1422 CBC with platelets differential(!)  Mild leukocytosis but otherwise unremarkable.  Patient does have SIRS criteria  however no known source of infection currently.  I have not called code sepsis at this time as I do not have a source of infection.  Awaiting CT scan of the abdomen.  If she does have an intra-abdominal abscess then code sepsis will be called.   1422 UA with Microscopic reflex to Culture(!)  No signs of urinary tract infection   1422 Awaiting CT scan as main problem is abdominal pain.   1519 Case signed out to oncoming provider.  Her heart rate is improved to the 1 teens.  She status post 1 L.  I have ordered another liter as well as a repeat lactic.  If CT scan is unremarkable and lactic downtrends after fluids and patient feels well as well as improvement of her tachycardia I believe she can be safely discharged home.  This was communicated to oncoming provider.  Case signed out to oncoming provider and ultimate disposition and further treatment at her discretion.       Not Applicable    I discussed the patient with the hospitalist, Dr. Pacheco, who accepts the patient for admission.     At the conclusion of the encounter I discussed the results of all of the tests and the disposition. The questions were answered. The patient or family acknowledged understanding and was agreeable with the care plan.     MEDICATIONS GIVEN IN THE EMERGENCY:  Medications   lactated ringers BOLUS 1,000 mL (1,000 mLs Intravenous $New Bag 9/15/24 1325)   acetaminophen (TYLENOL) tablet 975 mg (975 mg Oral $Given 9/15/24 1332)   ondansetron (ZOFRAN) injection 4 mg (4 mg Intravenous $Given 9/15/24 1324)   famotidine (PEPCID) injection 20 mg (20 mg Intravenous $Given 9/15/24 1325)   lactated ringers BOLUS 1,000 mL (1,000 mLs Intravenous $New Bag 9/15/24 1455)   iopamidol (ISOVUE-370) solution 90 mL (90 mLs Intravenous $Given 9/15/24 1510)       NEW PRESCRIPTIONS STARTED AT TODAY'S ER VISIT  New Prescriptions    No medications on file     Modified Medications    No medications on file  "      =================================================================    HPI    Patient information was obtained from: Patient    Use of : N/A         Carline Hart is a 29 year old female with a pertinent history of PCOS who presents to this ED for evaluation of abdominal pain.    Patient woke up at 3 am this morning feeling a \"knot\" in her stomach so she went to the bathroom and diarrhea. Then she developed nausea and vomiting. She had 4 episodes of vomiting and diarrhea since onset. She noted flexes of blood in her vomit but no eliecer hematemesis. Patient reports she had takeout yesterday and felt fine after. No one in her family had similar symptoms. She reports that her abdominal pain started in the epigastric region but now it has moved towards the right lower quadrant and into the back. It is a \"sharp\" pain. She endorses a similar episode where she was found to have gallstones and needed her gallbladder removed 2 years ago. She had a virtual urgent care visit earlier today and was prescribed Zofran. She was told to come to the ED if she developed a fever or shortness of breath. She went home and checked her temperature which read 101.5 F. She also had chest heaviness and difficulty getting a full breath in, which prompted her to the ED. Her LMP was 2 weeks ago. She has a history of PCOS. Patient denies any other symptoms.     She denies alcohol, marijuana, or tobacco use.    PHYSICAL EXAM    BP (!) 146/78   Pulse (!) 130   Temp 100  F (37.8  C) (Oral)   Resp 24   Ht 1.676 m (5' 6\")   Wt 114.8 kg (253 lb)   SpO2 98%   BMI 40.84 kg/m      Constitutional: Well developed, well nourished. Comfortable appearing.  Head: Normocephalic, atraumatic, mucous membranes dry, nose normal.   Neck: Supple, gross ROM intact.   Eyes: Pupils mid-range, sclera white.  Respiratory: Clear to auscultation bilaterally, no respiratory distress, no wheezing, speaks full sentences easily.  Cardiovascular: " Tachycardic, regular rhythm, no murmurs. No lower extremity edema.   GI: Soft, Epigastric tenderness. No mcburney's point or flores's sign.  Musculoskeletal: Moving all 4 extremities intentionally and without pain. No obvious deformity.  Skin: Warm, dry, no rash.  Neurologic: Alert & oriented x 3, speech clear, moving all extremities spontaneously   Psychiatric: Affect normal, cooperative.       LAB:  All pertinent labs reviewed and interpreted.  Results for orders placed or performed during the hospital encounter of 09/15/24   Symptomatic Influenza A/B, RSV, & SARS-CoV2 PCR (COVID-19) Nasopharyngeal    Specimen: Nasopharyngeal; Swab   Result Value Ref Range    Influenza A PCR Negative Negative    Influenza B PCR Negative Negative    RSV PCR Negative Negative    SARS CoV2 PCR Negative Negative   Comprehensive metabolic panel   Result Value Ref Range    Sodium 141 135 - 145 mmol/L    Potassium 4.1 3.4 - 5.3 mmol/L    Carbon Dioxide (CO2) 25 22 - 29 mmol/L    Anion Gap 12 7 - 15 mmol/L    Urea Nitrogen 9.8 6.0 - 20.0 mg/dL    Creatinine 0.69 0.51 - 0.95 mg/dL    GFR Estimate >90 >60 mL/min/1.73m2    Calcium 8.6 (L) 8.8 - 10.4 mg/dL    Chloride 104 98 - 107 mmol/L    Glucose 118 (H) 70 - 99 mg/dL    Alkaline Phosphatase 103 40 - 150 U/L    AST 22 0 - 45 U/L    ALT 19 0 - 50 U/L    Protein Total 7.3 6.4 - 8.3 g/dL    Albumin 4.3 3.5 - 5.2 g/dL    Bilirubin Total 0.6 <=1.2 mg/dL   Result Value Ref Range    Lipase 21 13 - 60 U/L   UA with Microscopic reflex to Culture    Specimen: Urine, Clean Catch   Result Value Ref Range    Color Urine Light Yellow Colorless, Straw, Light Yellow, Yellow    Appearance Urine Clear Clear    Glucose Urine Negative Negative mg/dL    Bilirubin Urine Negative Negative    Ketones Urine Negative Negative mg/dL    Specific Gravity Urine 1.021 1.001 - 1.030    Blood Urine 0.06 mg/dL (A) Negative    pH Urine 7.5 (H) 5.0 - 7.0    Protein Albumin Urine Negative Negative mg/dL    Urobilinogen Urine  <2.0 <2.0 mg/dL    Nitrite Urine Negative Negative    Leukocyte Esterase Urine Negative Negative    Mucus Urine Present (A) None Seen /LPF    RBC Urine 7 (H) <=2 /HPF    WBC Urine <1 <=5 /HPF    Squamous Epithelials Urine 1 <=1 /HPF   HCG qualitative Blood   Result Value Ref Range    hCG Serum Qualitative Negative Negative   Lactic acid whole blood with 1x repeat in 2 hr when >2   Result Value Ref Range    Lactic Acid, Initial 2.4 (H) 0.7 - 2.0 mmol/L   CBC with platelets and differential   Result Value Ref Range    WBC Count 13.9 (H) 4.0 - 11.0 10e3/uL    RBC Count 4.75 3.80 - 5.20 10e6/uL    Hemoglobin 14.2 11.7 - 15.7 g/dL    Hematocrit 40.8 35.0 - 47.0 %    MCV 86 78 - 100 fL    MCH 29.9 26.5 - 33.0 pg    MCHC 34.8 31.5 - 36.5 g/dL    RDW 12.3 10.0 - 15.0 %    Platelet Count 278 150 - 450 10e3/uL    % Neutrophils 95 %    % Lymphocytes 2 %    % Monocytes 2 %    % Eosinophils 0 %    % Basophils 0 %    % Immature Granulocytes 0 %    NRBCs per 100 WBC 0 <1 /100    Absolute Neutrophils 13.2 (H) 1.6 - 8.3 10e3/uL    Absolute Lymphocytes 0.3 (L) 0.8 - 5.3 10e3/uL    Absolute Monocytes 0.3 0.0 - 1.3 10e3/uL    Absolute Eosinophils 0.0 0.0 - 0.7 10e3/uL    Absolute Basophils 0.0 0.0 - 0.2 10e3/uL    Absolute Immature Granulocytes 0.1 <=0.4 10e3/uL    Absolute NRBCs 0.0 10e3/uL       RADIOLOGY:  Reviewed all pertinent imaging. Please see official radiology report.  CT Abdomen Pelvis w Contrast    (Results Pending)           Xavier Wang MD  [facility] EMERGENCY ROOM  [address] [facility] [id number] [phone]   =================================================================    BILLING:  Data  Category 1  Non-ED record review, if applicable. External record reviewed: N/A     Clinical information was obtained from an independent historian. History was obtained from: Patient     The following testing was considered but ultimately not selected after discussion with  patient/family: N/A     Category 2  My independent interpretation of EKG, rhythm strip, radiology study: CT abdomen and pelvis did not reveal large AAA     Category 3  Discussion of management with other physician/healthcare provider/other source: N/A       Risk  Prescription medication was considered, but ultimately not given after discussion with patient/family: I considered ordering a prescription for narcotic pain medicine.  However, I feel patient's condition can be adequately treated with non-narcotic medications and that the risk of a narcotic pain medicine prescription outweighs the benefits.     Chronic conditions affecting care:  PCOS     Care significantly affected by Social Determinants of Health: N/A     Consideration of Admission/Observation: Escalation of care was considered but workup was not complete prior to  ending my shift.  Case signed out to oncoming provider and ultimate disposition at her discretion.      [facility] Documentation:   CMS Diagnoses: The Lactic acid level is elevated due to dehydration, at this time there is no sign of severe sepsis or septic shock.             I, Pati Vega, am serving as a scribe to document services personally performed byXavier Wang MD based on my observation and the provider's statements to me. I, Xavier Wang MD, attest that Pati Vega is acting in a scribe capacity, has observed my performance of the services and has documented them in accordance with my direction.       Xavier Wang MD  09/15/24 [id number]

## 2024-09-16 ASSESSMENT — ACTIVITIES OF DAILY LIVING (ADL)
ADLS_ACUITY_SCORE: 35
ADLS_ACUITY_SCORE: 35

## 2024-09-16 NOTE — DISCHARGE INSTRUCTIONS
There is an incidental findings of a 4 mm nodule in your right lung. Make your primary care provider aware of this, but you should not have to do anything about this.

## 2024-09-17 ENCOUNTER — PATIENT OUTREACH (OUTPATIENT)
Dept: FAMILY MEDICINE | Facility: CLINIC | Age: 29
End: 2024-09-17
Payer: COMMERCIAL

## 2024-09-17 NOTE — TELEPHONE ENCOUNTER
#1 Attempted to contact patient. No Answer. LMTCB      Transitions of Care Outreach  Chief Complaint   Patient presents with    Hospital F/U       Most Recent Admission Date: 9/15/2024   Most Recent Admission Diagnosis:      Most Recent Discharge Date: 9/16/2024   Most Recent Discharge Diagnosis: Abdominal pain, unspecified abdominal location - R10.9  Nausea vomiting and diarrhea - R11.2, R19.7  Elevated lactic acid level - R79.89     Transitions of Care Assessment         Follow up Plan          Future Appointments   Date Time Provider Department Center   9/18/2024  4:00 PM Lynn Shell NP [facility]   11/22/2024  1:30 PM Lynn Shell NP [facility]       Outpatient Plan as outlined on AVS reviewed with patient.    For any urgent concerns, please contact our 24 hour nurse triage line: [phone] ([phone])       Phil Rossi RN

## 2024-09-17 NOTE — TELEPHONE ENCOUNTER
"ED/Discharge Protocol    \"Hi, my name is Cristy Mejia RN, a registered nurse, and I am calling on behalf of Lynn Shell's office at [place].  I am calling to follow up and see how things are going for you after your recent visit.\"    \"I see that you were in the (ER/UC/IP) on 9/15/24.    How are you doing now that you are home?\" Pretty good, some nausea still but the Zofran is helping. I am eating smaller meal which seems to be helping.     Is patient experiencing symptoms that may require a hospital visit?  No     Discharge Instructions    \"Let's review your discharge instructions.  What is/are the follow-up recommendations?  Pt. Response: ED follow up with pcp     \"Were you instructed to make a follow-up appointment?\"  Pt. Response: Yes.  Has appointment been made?   Yes      \"When you see the provider, I would recommend that you bring your discharge instructions with you.    Medications    \"How many new medications are you on since your hospitalization/ED visit?\"    0-1  \"How many of your current medicines changed (dose, timing, name, etc.) while you were in the hospital/ED visit?\"   0-1  \"Do you have questions about your medications?\"   No  \"Were you newly diagnosed with heart failure, COPD, diabetes or did you have a heart attack?\"   No  For patients on insulin: \"Did you start on insulin in the hospital or did you have your insulin dose changed?\"   No  Post Discharge Medication Reconciliation Status: discharge medications reconciled, continue medications without change.    Was MTM referral placed (*Make sure to put transitions as reason for referral)?   No    Call Summary    \"Do you have any questions or concerns about your condition or care plan at the moment?\"    No  Triage nurse advice given: N/A    Patient was in ER 1x in the past year (assess appropriateness of ER visits.)      \"If you have questions or things don't continue to improve, we encourage you contact us through the main clinic number,  " "[phone].  Even if the clinic is not open, triage nurses are available 24/7 to help you.     We would like you to know that our clinic has extended hours (provide information).  We also have urgent care (provide details on closest location and hours/contact info)\"      \"Thank you for your time and take care!\"     " [Joint Pain] : joint pain [Joint Stiffness] : joint stiffness [Negative] : Heme/Lymph

## 2024-09-18 ENCOUNTER — OFFICE VISIT (OUTPATIENT)
Dept: FAMILY MEDICINE | Facility: CLINIC | Age: 29
End: 2024-09-18
Payer: COMMERCIAL

## 2024-09-18 VITALS
DIASTOLIC BLOOD PRESSURE: 78 MMHG | HEART RATE: 82 BPM | BODY MASS INDEX: 40.82 KG/M2 | SYSTOLIC BLOOD PRESSURE: 114 MMHG | OXYGEN SATURATION: 98 % | TEMPERATURE: 97.3 F | WEIGHT: 254 LBS | HEIGHT: 66 IN | RESPIRATION RATE: 14 BRPM

## 2024-09-18 DIAGNOSIS — R91.1 PULMONARY NODULE: ICD-10-CM

## 2024-09-18 DIAGNOSIS — A08.4 VIRAL GASTROENTERITIS: Primary | ICD-10-CM

## 2024-09-18 PROCEDURE — 99213 OFFICE O/P EST LOW 20 MIN: CPT | Performed by: NURSE PRACTITIONER

## 2024-09-18 RX ORDER — ONDANSETRON 4 MG/1
4 TABLET, ORALLY DISINTEGRATING ORAL
COMMUNITY
Start: 2024-09-15

## 2024-09-18 NOTE — PROGRESS NOTES
"  Assessment & Plan     Viral gastroenteritis  Symptomatically improving.  Continue with rest, hydration, and simple diet.      Pulmonary nodule  Incidental finding on CT exam.  Patient is low risk for malignancy.  I do not recommend any follow up on this.           MED REC REQUIRED  Post Medication Reconciliation Status:  Discharge medications reconciled, continue medications without change  BMI  Estimated body mass index is 41 kg/m  as calculated from the following:    Height as of this encounter: 1.676 m (5' 6\").    Weight as of this encounter: 115.2 kg (254 lb).       José Antonio Crowley is a 29 year old who presents for ER follow-up.  Patient was seen in the ER 3 days ago with acute abdominal pain, vomiting, and diarrhea.  Workup included labs and CT imaging.  Her WBC count and lactic acid level was elevated.  She was tachycardic throughout her ED visit.  CT of the abdomen was negative for any acute process.  CT of her chest did not show any acute findings.  There was a 4 mm lung nodule present.  Patient is feeling much better today.  She had a normal, formed bowel movement this morning.  Nausea got a lot better this morning.  Appetite is slowly returning to normal.  She is still feeling tired.      Follow Up (Follow up from CT scan on chest in ER and found nodule in lung ; been feeling tired but much better)          Objective    /78 (BP Location: Right arm, Patient Position: Sitting, Cuff Size: Adult Large)   Pulse 82   Temp 97.3  F (36.3  C) (Temporal)   Resp 14   Ht 1.676 m (5' 6\")   Wt 115.2 kg (254 lb)   SpO2 98%   BMI 41.00 kg/m    Body mass index is 41 kg/m .  Physical Exam   GENERAL: alert and no distress  RESP: lungs clear to auscultation - no rales, rhonchi or wheezes  CV: regular rate and rhythm, normal S1 S2, no S3 or S4, no murmur, click or rub, no peripheral edema   ABDOMEN: soft, nontender, no hepatosplenomegaly, no masses and bowel sounds normal            Signed Electronically " by: Lynn Shell, NP

## 2024-09-26 ENCOUNTER — VIRTUAL VISIT (OUTPATIENT)
Dept: URGENT CARE | Facility: CLINIC | Age: 29
End: 2024-09-26
Payer: COMMERCIAL

## 2024-09-26 DIAGNOSIS — J01.10 ACUTE NON-RECURRENT FRONTAL SINUSITIS: Primary | ICD-10-CM

## 2024-09-26 PROCEDURE — 99441 PR PHYSICIAN TELEPHONE EVALUATION 5-10 MIN: CPT | Mod: 93

## 2024-09-26 RX ORDER — BENZONATATE 200 MG/1
200 CAPSULE ORAL 3 TIMES DAILY PRN
Qty: 21 CAPSULE | Refills: 0 | Status: SHIPPED | OUTPATIENT
Start: 2024-09-26 | End: 2024-10-03

## 2024-09-26 RX ORDER — DOXYCYCLINE 100 MG/1
100 CAPSULE ORAL 2 TIMES DAILY
Qty: 14 CAPSULE | Refills: 0 | Status: SHIPPED | OUTPATIENT
Start: 2024-09-26 | End: 2024-10-03

## 2024-09-26 NOTE — PROGRESS NOTES
"Carline is a 29 year old who is being evaluated via a billable telephone visit.    What phone number would you like to be contacted at? [phone]  How would you like to obtain your AVS? [facility]      Assessment & Plan     (J01.10) Acute non-recurrent frontal sinusitis  (primary encounter diagnosis)    Plan: doxycycline hyclate (VIBRAMYCIN) 100 MG         capsule, benzonatate (TESSALON) 200 MG capsule      SHIRA Cortes CNP      Subjective   Carline is a 29 year old, presenting for the following health issues:  URI    HPI   Cold sx 1 week with nasal congestion progressing worsening  Now cough is worst symptom. Productive cough \"burning\", sinus forehead hurts.   Using otc not helping  Some mild wheezing. No hx asthma  Fever today 99.  No known exposure   Covid negative  Hydrated       Objective         Vitals:  No vitals were obtained today due to virtual visit.    Physical Exam   GENERAL: alert and no distress  RESP: No audible wheeze  PSYCH: Appropriate affect, tone, and pace of words        Video-Visit Details  Telephone time spent 10 minutes  Type of service:  Telephone Visit   Originating Location (pt. Location): Home    Distant Location (provider location):  Off-site  Platform used for Video Visit: [place]  Signed Electronically by: [facility] Urgent [facility]    "

## 2024-11-22 ENCOUNTER — ANCILLARY PROCEDURE (OUTPATIENT)
Dept: GENERAL RADIOLOGY | Facility: CLINIC | Age: 29
End: 2024-11-22
Attending: NURSE PRACTITIONER
Payer: COMMERCIAL

## 2024-11-22 ENCOUNTER — OFFICE VISIT (OUTPATIENT)
Dept: FAMILY MEDICINE | Facility: CLINIC | Age: 29
End: 2024-11-22
Payer: COMMERCIAL

## 2024-11-22 VITALS
DIASTOLIC BLOOD PRESSURE: 72 MMHG | TEMPERATURE: 97.2 F | BODY MASS INDEX: 38.95 KG/M2 | WEIGHT: 257 LBS | OXYGEN SATURATION: 99 % | HEIGHT: 68 IN | RESPIRATION RATE: 16 BRPM | SYSTOLIC BLOOD PRESSURE: 118 MMHG | HEART RATE: 88 BPM

## 2024-11-22 DIAGNOSIS — Z13.1 SCREENING FOR DIABETES MELLITUS: ICD-10-CM

## 2024-11-22 DIAGNOSIS — E55.9 VITAMIN D DEFICIENCY: ICD-10-CM

## 2024-11-22 DIAGNOSIS — R53.83 FATIGUE, UNSPECIFIED TYPE: ICD-10-CM

## 2024-11-22 DIAGNOSIS — G93.31 POST VIRAL SYNDROME: ICD-10-CM

## 2024-11-22 DIAGNOSIS — Z00.00 ROUTINE GENERAL MEDICAL EXAMINATION AT A HEALTH CARE FACILITY: Primary | ICD-10-CM

## 2024-11-22 DIAGNOSIS — Z13.220 SCREENING FOR LIPID DISORDERS: ICD-10-CM

## 2024-11-22 DIAGNOSIS — R05.8 POST-VIRAL COUGH SYNDROME: ICD-10-CM

## 2024-11-22 LAB
ANION GAP SERPL CALCULATED.3IONS-SCNC: 8 MMOL/L (ref 7–15)
BUN SERPL-MCNC: 12.2 MG/DL (ref 6–20)
CALCIUM SERPL-MCNC: 8.9 MG/DL (ref 8.8–10.4)
CHLORIDE SERPL-SCNC: 107 MMOL/L (ref 98–107)
CHOLEST SERPL-MCNC: 184 MG/DL
CREAT SERPL-MCNC: 0.72 MG/DL (ref 0.51–0.95)
EGFRCR SERPLBLD CKD-EPI 2021: >90 ML/MIN/1.73M2
ERYTHROCYTE [DISTWIDTH] IN BLOOD BY AUTOMATED COUNT: 12.1 % (ref 10–15)
EST. AVERAGE GLUCOSE BLD GHB EST-MCNC: 108 MG/DL
FASTING STATUS PATIENT QL REPORTED: NO
FASTING STATUS PATIENT QL REPORTED: NO
GLUCOSE SERPL-MCNC: 116 MG/DL (ref 70–99)
HBA1C MFR BLD: 5.4 % (ref 0–5.6)
HCO3 SERPL-SCNC: 24 MMOL/L (ref 22–29)
HCT VFR BLD AUTO: 38.2 % (ref 35–47)
HDLC SERPL-MCNC: 39 MG/DL
HGB BLD-MCNC: 12.9 G/DL (ref 11.7–15.7)
IRON BINDING CAPACITY (ROCHE): 325 UG/DL (ref 240–430)
IRON SATN MFR SERPL: 24 % (ref 15–46)
IRON SERPL-MCNC: 78 UG/DL (ref 37–145)
LDLC SERPL CALC-MCNC: 120 MG/DL
MCH RBC QN AUTO: 30.2 PG (ref 26.5–33)
MCHC RBC AUTO-ENTMCNC: 33.8 G/DL (ref 31.5–36.5)
MCV RBC AUTO: 90 FL (ref 78–100)
NONHDLC SERPL-MCNC: 145 MG/DL
PLATELET # BLD AUTO: 289 10E3/UL (ref 150–450)
POTASSIUM SERPL-SCNC: 4.4 MMOL/L (ref 3.4–5.3)
RBC # BLD AUTO: 4.27 10E6/UL (ref 3.8–5.2)
SODIUM SERPL-SCNC: 139 MMOL/L (ref 135–145)
TRIGL SERPL-MCNC: 126 MG/DL
TSH SERPL DL<=0.005 MIU/L-ACNC: 1.2 UIU/ML (ref 0.3–4.2)
VIT B12 SERPL-MCNC: 681 PG/ML (ref 232–1245)
VIT D+METAB SERPL-MCNC: 23 NG/ML (ref 20–50)
WBC # BLD AUTO: 6.2 10E3/UL (ref 4–11)

## 2024-11-22 PROCEDURE — 85027 COMPLETE CBC AUTOMATED: CPT | Performed by: NURSE PRACTITIONER

## 2024-11-22 PROCEDURE — 83550 IRON BINDING TEST: CPT | Performed by: NURSE PRACTITIONER

## 2024-11-22 PROCEDURE — 90656 IIV3 VACC NO PRSV 0.5 ML IM: CPT | Performed by: NURSE PRACTITIONER

## 2024-11-22 PROCEDURE — 82607 VITAMIN B-12: CPT | Performed by: NURSE PRACTITIONER

## 2024-11-22 PROCEDURE — 80048 BASIC METABOLIC PNL TOTAL CA: CPT | Performed by: NURSE PRACTITIONER

## 2024-11-22 PROCEDURE — 82306 VITAMIN D 25 HYDROXY: CPT | Performed by: NURSE PRACTITIONER

## 2024-11-22 PROCEDURE — 83540 ASSAY OF IRON: CPT | Performed by: NURSE PRACTITIONER

## 2024-11-22 PROCEDURE — 80061 LIPID PANEL: CPT | Performed by: NURSE PRACTITIONER

## 2024-11-22 PROCEDURE — 99213 OFFICE O/P EST LOW 20 MIN: CPT | Mod: 25 | Performed by: NURSE PRACTITIONER

## 2024-11-22 PROCEDURE — 99395 PREV VISIT EST AGE 18-39: CPT | Mod: 25 | Performed by: NURSE PRACTITIONER

## 2024-11-22 PROCEDURE — 83036 HEMOGLOBIN GLYCOSYLATED A1C: CPT | Performed by: NURSE PRACTITIONER

## 2024-11-22 PROCEDURE — 71046 X-RAY EXAM CHEST 2 VIEWS: CPT | Mod: TC | Performed by: RADIOLOGY

## 2024-11-22 PROCEDURE — 36415 COLL VENOUS BLD VENIPUNCTURE: CPT | Performed by: NURSE PRACTITIONER

## 2024-11-22 PROCEDURE — 90471 IMMUNIZATION ADMIN: CPT | Performed by: NURSE PRACTITIONER

## 2024-11-22 PROCEDURE — 84443 ASSAY THYROID STIM HORMONE: CPT | Performed by: NURSE PRACTITIONER

## 2024-11-22 RX ORDER — FAMOTIDINE 20 MG
1 TABLET ORAL DAILY
Qty: 90 CAPSULE | Refills: 3 | Status: SHIPPED | OUTPATIENT
Start: 2024-11-22

## 2024-11-22 SDOH — HEALTH STABILITY: PHYSICAL HEALTH: ON AVERAGE, HOW MANY MINUTES DO YOU ENGAGE IN EXERCISE AT THIS LEVEL?: 10 MIN

## 2024-11-22 SDOH — HEALTH STABILITY: PHYSICAL HEALTH: ON AVERAGE, HOW MANY DAYS PER WEEK DO YOU ENGAGE IN MODERATE TO STRENUOUS EXERCISE (LIKE A BRISK WALK)?: 1 DAY

## 2024-11-22 ASSESSMENT — SOCIAL DETERMINANTS OF HEALTH (SDOH): HOW OFTEN DO YOU GET TOGETHER WITH FRIENDS OR RELATIVES?: TWICE A WEEK

## 2024-11-22 NOTE — PATIENT INSTRUCTIONS
Patient Education   Preventive Care Advice   This is general advice given by our system to help you stay healthy. However, your care team may have specific advice just for you. Please talk to your care team about your preventive care needs.  Nutrition  Eat 5 or more servings of fruits and vegetables each day.  Try wheat bread, brown rice and whole grain pasta (instead of white bread, rice, and pasta).  Get enough calcium and vitamin D. Check the label on foods and aim for 100% of the RDA (recommended daily allowance).  Lifestyle  Exercise at least 150 minutes each week  (30 minutes a day, 5 days a week).  Do muscle strengthening activities 2 days a week. These help control your weight and prevent disease.  No smoking.  Wear sunscreen to prevent skin cancer.  Have a dental exam and cleaning every 6 months.  Yearly exams  See your health care team every year to talk about:  Any changes in your health.  Any medicines your care team has prescribed.  Preventive care, family planning, and ways to prevent chronic diseases.  Shots (vaccines)   HPV shots (up to age 26), if you've never had them before.  Hepatitis B shots (up to age 59), if you've never had them before.  COVID-19 shot: Get this shot when it's due.  Flu shot: Get a flu shot every year.  Tetanus shot: Get a tetanus shot every 10 years.  Pneumococcal, hepatitis A, and RSV shots: Ask your care team if you need these based on your risk.  Shingles shot (for age 50 and up)  General health tests  Diabetes screening:  Starting at age 35, Get screened for diabetes at least every 3 years.  If you are younger than age 35, ask your care team if you should be screened for diabetes.  Cholesterol test: At age 39, start having a cholesterol test every 5 years, or more often if advised.  Bone density scan (DEXA): At age 50, ask your care team if you should have this scan for osteoporosis (brittle bones).  Hepatitis C: Get tested at least once in your life.  STIs (sexually  transmitted infections)  Before age 24: Ask your care team if you should be screened for STIs.  After age 24: Get screened for STIs if you're at risk. You are at risk for STIs (including HIV) if:  You are sexually active with more than one person.  You don't use condoms every time.  You or a partner was diagnosed with a sexually transmitted infection.  If you are at risk for HIV, ask about PrEP medicine to prevent HIV.  Get tested for HIV at least once in your life, whether you are at risk for HIV or not.  Cancer screening tests  Cervical cancer screening: If you have a cervix, begin getting regular cervical cancer screening tests starting at age 21.  Breast cancer scan (mammogram): If you've ever had breasts, begin having regular mammograms starting at age 40. This is a scan to check for breast cancer.  Colon cancer screening: It is important to start screening for colon cancer at age 45.  Have a colonoscopy test every 10 years (or more often if you're at risk) Or, ask your provider about stool tests like a FIT test every year or Cologuard test every 3 years.  To learn more about your testing options, visit:   .  For help making a decision, visit:   https://bit.ly/rp43451.  Prostate cancer screening test: If you have a prostate, ask your care team if a prostate cancer screening test (PSA) at age 55 is right for you.  Lung cancer screening: If you are a current or former smoker ages 50 to 80, ask your care team if ongoing lung cancer screenings are right for you.  For informational purposes only. Not to replace the advice of your health care provider. Copyright   2023 [employer]. All rights reserved. Clinically reviewed by the [facility] Transitions Program. [employer] [id number] - REV 01/24.

## 2024-11-22 NOTE — PROGRESS NOTES
"Preventive Care Visit  [facility]  Lynn Shell NP, Family Medicine  Nov 22, 2024  {Provider  Link to [facility] :[id number]}    Assessment & Plan     Routine general medical examination at a health care facility  ***    Vitamin D deficiency  ***  - Vitamin D, Cholecalciferol, 25 MCG (1000 UT) CAPS  Dispense: 90 capsule; Refill: 3  - Vitamin D Deficiency  - Vitamin D Deficiency    Fatigue, unspecified type  ***  - TSH with free T4 reflex  - CBC with platelets  - Vitamin B12  - Iron and iron binding capacity  - TSH with free T4 reflex  - CBC with platelets  - Vitamin B12  - Iron and iron binding capacity    Screening for diabetes mellitus  ***  - Basic metabolic panel  - Hemoglobin A1c  - Basic metabolic panel  - Hemoglobin A1c    Screening for lipid disorders  ***  - Lipid panel reflex to direct LDL Fasting  - Lipid panel reflex to direct LDL Fasting    Post-viral cough syndrome  ***  - XR Chest 2 Views            BMI  Estimated body mass index is 39.08 kg/m  as calculated from the following:    Height as of this encounter: 1.727 m (5' 8\").    Weight as of this encounter: 116.6 kg (257 lb).   Weight management plan: Discussed healthy diet and exercise guidelines    Counseling  Appropriate preventive services were addressed with this patient via screening, questionnaire, or discussion as appropriate for fall prevention, nutrition, physical activity, Tobacco-use cessation, social engagement, weight loss and cognition.  Checklist reviewing preventive services available has been given to the patient.  Reviewed patient's diet, addressing concerns and/or questions.   She is at risk for lack of exercise and has been provided with information to increase physical activity for the benefit of her well-being.   The patient was instructed to see the dentist every 6 months.           Subjective   Carline is a 29 year old, presenting for the following:  Physical (Nonfasting ; ongoing cough since September " )      Health Care Directive  Patient does not have a Health Care Directive: Discussed advance care planning with patient; however, patient declined at this time.      11/22/2024   General Health   How would you rate your overall physical health? Good   Feel stress (tense, anxious, or unable to sleep) Not at all            11/22/2024   Nutrition   Three or more servings of calcium each day? (!) I DON'T KNOW   Diet: Regular (no restrictions)   How many servings of fruit and vegetables per day? (!) 0-1   How many sweetened beverages each day? 0-1            11/22/2024   Exercise   Days per week of moderate/strenous exercise 1 day   Average minutes spent exercising at this level 10 min      (!) EXERCISE CONCERN      11/22/2024   Social Factors   Frequency of gathering with friends or relatives Twice a week   Worry food won't last until get money to buy more No   Food not last or not have enough money for food? No   Do you have housing? (Housing is defined as stable permanent housing and does not include staying ouside in a car, in a tent, in an abandoned building, in an overnight shelter, or couch-surfing.) Yes   Are you worried about losing your housing? No   Lack of transportation? No   Unable to get utilities (heat,electricity)? No            11/22/2024   Dental   Dentist two times every year? (!) NO            11/22/2024   TB Screening   Were you born outside of the [place]? No            Today's PHQ-2 Score:       11/22/2024     1:08 PM   PHQ-2 ( 1999 [employer])   Q1: Little interest or pleasure in doing things 0    Q2: Feeling down, depressed or hopeless 0    PHQ-2 Score 0    Q1: Little interest or pleasure in doing things Not at all   Q2: Feeling down, depressed or hopeless Not at all   PHQ-2 Score 0       Patient-reported           11/22/2024   Substance Use   Alcohol more than 3/day or more than 7/wk No   Do you use any other substances recreationally? No        Social History     Tobacco Use    Smoking status: Never  "   Smokeless tobacco: Never   Vaping Use    Vaping status: Never Used   Substance Use Topics    Alcohol use: Yes     Comment: Rare use.    Drug use: Not Currently     {Provider  If there are gaps in the social history shown above, please follow the link to update and then refresh the note Link to Social and Substance History :541904}     {Mammogram Decision Support (Optional):051531}          11/22/2024   One time HIV Screening   Previous HIV test? Yes          11/22/2024   STI Screening   New sexual partner(s) since last STI/HIV test? No        History of abnormal Pap smear: No - age 30- 64 PAP with HPV every 5 years recommended        2/2/2022     2:13 PM 6/24/2019     1:45 PM   PAP / HPV   PAP Negative for Intraepithelial Lesion or Malignancy (NILM)  Negative for squamous intraepithelial lesion or malignancy  Electronically signed by Aditi Bean CT (ASCP) on 6/26/2019 at 11:07 AM              11/22/2024   Contraception/Family Planning   Questions about contraception or family planning No        {Provider  REQUIRED FOR [facility]V Use the storyboard to review patient history, after sections have been marked as reviewed, refresh note to capture documentation:828082}   Reviewed and updated as needed this visit by Provider   Tobacco  Allergies  Meds  Problems  Med Hx  Surg Hx  Fam Hx              Objective    Exam  /72 (BP Location: Right arm, Patient Position: Sitting, Cuff Size: Adult Large)   Pulse 88   Temp 97.2  F (36.2  C) (Temporal)   Resp 16   Ht 1.727 m (5' 8\")   Wt 116.6 kg (257 lb)   LMP 11/22/2024   SpO2 99%   BMI 39.08 kg/m     Estimated body mass index is 39.08 kg/m  as calculated from the following:    Height as of this encounter: 1.727 m (5' 8\").    Weight as of this encounter: 116.6 kg (257 lb).    Physical Exam  GENERAL: alert and no distress  EYES: Eyes grossly normal to inspection, PERRL and conjunctivae and sclerae normal  HENT: ear canals and TM's normal, nose and mouth " without ulcers or lesions  NECK: no adenopathy, no asymmetry, masses, or scars  RESP: lungs clear to auscultation - no rales, rhonchi or wheezes  BREAST: normal without masses, tenderness or nipple discharge and no palpable axillary masses or adenopathy  CV: regular rate and rhythm, normal S1 S2, no S3 or S4, no murmur, click or rub, no peripheral edema  ABDOMEN: soft, nontender, no hepatosplenomegaly, no masses and bowel sounds normal  MS: no gross musculoskeletal defects noted, no edema  SKIN: no suspicious lesions or rashes  NEURO: Normal strength and tone, mentation intact and speech normal  PSYCH: mentation appears normal, affect normal/bright        Signed Electronically by: Lynn Shell NP  {Email feedback regarding this note to [email or web address]   :[id number]}

## 2025-03-27 ENCOUNTER — TRANSFERRED RECORDS (OUTPATIENT)
Dept: HEALTH INFORMATION MANAGEMENT | Facility: CLINIC | Age: 30
End: 2025-03-27
Payer: COMMERCIAL

## 2025-06-02 ENCOUNTER — RESULTS FOLLOW-UP (OUTPATIENT)
Dept: FAMILY MEDICINE | Facility: CLINIC | Age: 30
End: 2025-06-02

## 2025-07-29 ENCOUNTER — OFFICE VISIT (OUTPATIENT)
Dept: URGENT CARE | Facility: URGENT CARE | Age: 30
End: 2025-07-29
Payer: COMMERCIAL

## 2025-07-29 VITALS
SYSTOLIC BLOOD PRESSURE: 118 MMHG | HEART RATE: 79 BPM | OXYGEN SATURATION: 98 % | DIASTOLIC BLOOD PRESSURE: 75 MMHG | TEMPERATURE: 99.3 F | BODY MASS INDEX: 43.15 KG/M2 | RESPIRATION RATE: 16 BRPM | HEIGHT: 65 IN | WEIGHT: 259 LBS

## 2025-07-29 DIAGNOSIS — R59.0 LEFT CERVICAL LYMPHADENOPATHY: Primary | ICD-10-CM

## 2025-07-29 PROCEDURE — 3074F SYST BP LT 130 MM HG: CPT | Performed by: PHYSICIAN ASSISTANT

## 2025-07-29 PROCEDURE — 99213 OFFICE O/P EST LOW 20 MIN: CPT | Performed by: PHYSICIAN ASSISTANT

## 2025-07-29 PROCEDURE — 3078F DIAST BP <80 MM HG: CPT | Performed by: PHYSICIAN ASSISTANT

## 2025-07-29 RX ORDER — CEFADROXIL 500 MG/1
500 CAPSULE ORAL 2 TIMES DAILY
Qty: 20 CAPSULE | Refills: 0 | Status: SHIPPED | OUTPATIENT
Start: 2025-07-29 | End: 2025-08-08

## 2025-07-29 NOTE — PROGRESS NOTES
Urgent Care Clinic Visit    Chief Complaint   Patient presents with    Ear Problem     Left ear discomfort and lymph node swelling under same ear x 2 days. Patient states she feels like mucus drainage in back of throat but no sore throat.

## 2025-07-29 NOTE — PROGRESS NOTES
Patient presents with:  Ear Problem: Left ear discomfort and lymph node swelling under same ear x 2 days. Patient states she feels like mucus drainage in back of throat but no sore throat.      Clinical Decision Making:  Treatment for left cervical lymphadenopathy with hot packs to the area 3 times per day and cefadroxil.  Advised patient that she will be treated with an antibiotic course for lymphadenopathy.  Recommend follow-up with primary care provider in 4 weeks if not getting full resolution or if new symptoms or concerns arise.  Expected course of resolution and indication for return was gone over and questions were answered to patient/parent's satisfaction before discharge.        ICD-10-CM    1. Left cervical lymphadenopathy  R59.0 cefadroxil (DURICEF) 500 MG capsule          Patient Instructions   Hot packs to the area 3 times per day 20 minutes on each hour.  Do not fall asleep with the hot packs on the skin.  Take the antibiotic as written  Use of probiotics to help prevent diarrhea.  Separate dosing of the antibiotic from the probiotic by 2 hours or they are not in the stomach at the same time.  Use of over-the-counter yogurt for helping with stomach upset and diarrhea.  Return to clinic if not getting good resolution after three or four weeks, sooner if new symptoms or concerns arise such as fever chills night sweats weight loss or increasing pain.      HPI:  Carline Hart is a 30 year old female who presents today concern for left submandibular lymph node swelling and discomfort.  Patient has not had jaw pain, tooth pain, sore throat or odynophagia.  No fever chills night sweats fatigue weight loss or other constitutional symptoms.  No treatment was tried for this at home.  Symptoms have started over the last 2 days.  She has not had known sick contacts.      History obtained from chart review and the patient.    Problem List:  2022-02: Morbid obesity (H)  2019-06: Vitamin D deficiency  2017-11: PCOS  "(polycystic ovarian syndrome)      Past Medical History:   Diagnosis Date    Environmental allergies     Ovarian cyst        Social History     Tobacco Use    Smoking status: Never    Smokeless tobacco: Never   Substance Use Topics    Alcohol use: Yes     Comment: Rare use.       Review of Systems  As above in HPI otherwise negative.    Vitals:    07/29/25 1733   BP: 118/75   Pulse: 79   Resp: 16   Temp: 99.3  F (37.4  C)   SpO2: 98%   Weight: 117.5 kg (259 lb)   Height: 1.651 m (5' 5\")       General: Patient is resting comfortably no acute distress is afebrile  HEENT: Head is normocephalic atraumatic   eyes are PERRL EOMI sclera anicteric   TMs are clear bilaterally  Throat is clear without erythema and no exudate  No noted pre or postauricular lymphadenopathy  No parotid swelling or swelling over the rami of the jaw.  There was swelling and tenderness over the left submandibular lymph node.  No other anterior posterior chain and no submental lymphadenopathy palpated  Neck is supple full range of motion  LUNGS: Clear to auscultation bilaterally  HEART: Regular rate and rhythm  Skin: Without rash non-diaphoretic    Physical Exam    At the end of the encounter, I discussed results, diagnosis, medications. Discussed red flags for immediate return to clinic/ER, as well as indications for follow up if no improvement. Patient understood and agreed to plan. Patient was stable for discharge.  "

## 2025-07-29 NOTE — PATIENT INSTRUCTIONS
Hot packs to the area 3 times per day 20 minutes on each hour.  Do not fall asleep with the hot packs on the skin.  Take the antibiotic as written  Use of probiotics to help prevent diarrhea.  Separate dosing of the antibiotic from the probiotic by 2 hours or they are not in the stomach at the same time.  Use of over-the-counter yogurt for helping with stomach upset and diarrhea.  Return to clinic if not getting good resolution after three or four weeks, sooner if new symptoms or concerns arise such as fever chills night sweats weight loss or increasing pain.

## (undated) DEVICE — ENDO POUCH UNIV RETRIEVAL SYSTEM INZII 10MM CD001

## (undated) DEVICE — ADH SKIN CLOSURE PREMIERPRO EXOFIN 1.0ML 3470

## (undated) DEVICE — ENDO DISSECTOR BLUNT 05MM  BTD05

## (undated) DEVICE — ESU GROUND PAD UNIVERSAL W/O CORD

## (undated) DEVICE — SU MONOCRYL 4-0 PS-2 18" UND Y496G

## (undated) DEVICE — Device

## (undated) DEVICE — ENDO TROCAR FIRST ENTRY KII FIOS Z-THRD 05X100MM CTF03

## (undated) DEVICE — ESU SUCTION/IRRIGATION SYSTEM PISTOL GRIP

## (undated) DEVICE — SU VICRYL 3-0 SH 27" UND J416H

## (undated) DEVICE — GLOVE PROTEXIS W/NEU-THERA 7.5  2D73TE75

## (undated) DEVICE — DEVICE SUTURE GRASPER TROCAR CLOSURE 14GA PMITCSG

## (undated) DEVICE — SUCTION IRR STRYKERFLOW II W/TIP 250-070-520

## (undated) DEVICE — SURGICEL HEMOSTAT 4X8" 1952

## (undated) DEVICE — SOL NACL 0.9% INJ 1000ML BAG 07983-09

## (undated) DEVICE — ENDO TROCAR SLEEVE KII Z-THREADED 05X100MM CTS02

## (undated) DEVICE — PACK GENERAL LAPAOSCOPY

## (undated) DEVICE — SU PDS II 1 CT-1 MONOFIL Z347H

## (undated) DEVICE — ENDO TROCAR FIRST ENTRY KII FIOS Z-THRD 11X100MM CTF33

## (undated) DEVICE — SUCTION MANIFOLD NEPTUNE 2 SYS 4 PORT 0702-020-000

## (undated) DEVICE — ENDO CLIP CARTIRDGE K2 MED/LG 10 1112

## (undated) DEVICE — ESU PENCIL W/HOLSTER

## (undated) DEVICE — ESU ENDO SCISSORS 5MM CVD 5DCS

## (undated) DEVICE — ESU CORD MONOPOLAR HIGH FREQUENCY 26006M-D/10

## (undated) RX ORDER — LIDOCAINE HYDROCHLORIDE 20 MG/ML
INJECTION, SOLUTION EPIDURAL; INFILTRATION; INTRACAUDAL; PERINEURAL
Status: DISPENSED
Start: 2022-08-31

## (undated) RX ORDER — HYDROMORPHONE HYDROCHLORIDE 1 MG/ML
INJECTION, SOLUTION INTRAMUSCULAR; INTRAVENOUS; SUBCUTANEOUS
Status: DISPENSED
Start: 2022-08-31

## (undated) RX ORDER — PROPOFOL 10 MG/ML
INJECTION, EMULSION INTRAVENOUS
Status: DISPENSED
Start: 2022-08-31

## (undated) RX ORDER — BUPIVACAINE HYDROCHLORIDE AND EPINEPHRINE 2.5; 5 MG/ML; UG/ML
INJECTION, SOLUTION EPIDURAL; INFILTRATION; INTRACAUDAL; PERINEURAL
Status: DISPENSED
Start: 2022-08-31

## (undated) RX ORDER — ONDANSETRON 2 MG/ML
INJECTION INTRAMUSCULAR; INTRAVENOUS
Status: DISPENSED
Start: 2022-08-31

## (undated) RX ORDER — DEXAMETHASONE SODIUM PHOSPHATE 10 MG/ML
INJECTION, SOLUTION INTRAMUSCULAR; INTRAVENOUS
Status: DISPENSED
Start: 2022-08-31

## (undated) RX ORDER — KETOROLAC TROMETHAMINE 30 MG/ML
INJECTION, SOLUTION INTRAMUSCULAR; INTRAVENOUS
Status: DISPENSED
Start: 2022-08-31

## (undated) RX ORDER — FENTANYL CITRATE 50 UG/ML
INJECTION, SOLUTION INTRAMUSCULAR; INTRAVENOUS
Status: DISPENSED
Start: 2022-08-31